# Patient Record
Sex: MALE | Race: WHITE | NOT HISPANIC OR LATINO | Employment: FULL TIME | URBAN - NONMETROPOLITAN AREA
[De-identification: names, ages, dates, MRNs, and addresses within clinical notes are randomized per-mention and may not be internally consistent; named-entity substitution may affect disease eponyms.]

---

## 2017-02-03 ENCOUNTER — TRANSCRIBE ORDERS (OUTPATIENT)
Dept: LAB | Facility: OTHER | Age: 37
End: 2017-02-03

## 2017-02-03 ENCOUNTER — LAB (OUTPATIENT)
Dept: LAB | Facility: OTHER | Age: 37
End: 2017-02-03

## 2017-02-03 DIAGNOSIS — Z02.1 DRUG SCREENING, PRE-EMPLOYMENT: Primary | ICD-10-CM

## 2017-02-03 DIAGNOSIS — Z02.1 PRE-EMPLOYMENT DRUG TESTING: ICD-10-CM

## 2017-02-03 DIAGNOSIS — Z02.1 DRUG SCREENING, PRE-EMPLOYMENT: ICD-10-CM

## 2018-04-10 DIAGNOSIS — I10 HYPERTENSION, UNSPECIFIED TYPE: Primary | ICD-10-CM

## 2018-04-17 ENCOUNTER — LAB (OUTPATIENT)
Dept: LAB | Facility: OTHER | Age: 38
End: 2018-04-17

## 2018-04-17 DIAGNOSIS — I10 HYPERTENSION, UNSPECIFIED TYPE: ICD-10-CM

## 2018-04-17 LAB
ALBUMIN SERPL-MCNC: 4.4 G/DL (ref 3.2–5.5)
ALBUMIN/GLOB SERPL: 1.7 G/DL (ref 1–3)
ALP SERPL-CCNC: 66 U/L (ref 15–121)
ALT SERPL W P-5'-P-CCNC: 44 U/L (ref 10–60)
ANION GAP SERPL CALCULATED.3IONS-SCNC: 9 MMOL/L (ref 5–15)
AST SERPL-CCNC: 29 U/L (ref 10–60)
BILIRUB SERPL-MCNC: 0.9 MG/DL (ref 0.2–1)
BUN BLD-MCNC: 14 MG/DL (ref 8–25)
BUN/CREAT SERPL: 12.7 (ref 7–25)
CALCIUM SPEC-SCNC: 9.4 MG/DL (ref 8.4–10.8)
CHLORIDE SERPL-SCNC: 104 MMOL/L (ref 100–112)
CHOLEST SERPL-MCNC: 191 MG/DL (ref 150–200)
CO2 SERPL-SCNC: 27 MMOL/L (ref 20–32)
CREAT BLD-MCNC: 1.1 MG/DL (ref 0.4–1.3)
DEPRECATED RDW RBC AUTO: 41.9 FL (ref 35.1–43.9)
EOSINOPHIL # BLD MANUAL: 0.16 10*3/MM3 (ref 0–0.7)
EOSINOPHIL NFR BLD MANUAL: 2 % (ref 0–7)
ERYTHROCYTE [DISTWIDTH] IN BLOOD BY AUTOMATED COUNT: 12.8 % (ref 11.5–14.5)
GFR SERPL CREATININE-BSD FRML MDRD: 76 ML/MIN/1.73 (ref 70–162)
GLOBULIN UR ELPH-MCNC: 2.6 GM/DL (ref 2.5–4.6)
GLUCOSE BLD-MCNC: 96 MG/DL (ref 70–100)
HCT VFR BLD AUTO: 46 % (ref 39–49)
HDLC SERPL-MCNC: 44 MG/DL (ref 35–100)
HGB BLD-MCNC: 15.9 G/DL (ref 13.7–17.3)
LDLC SERPL CALC-MCNC: 113 MG/DL
LDLC/HDLC SERPL: 2.58 {RATIO}
LYMPHOCYTES # BLD MANUAL: 1.42 10*3/MM3 (ref 0.6–4.2)
LYMPHOCYTES NFR BLD MANUAL: 18 % (ref 10–50)
LYMPHOCYTES NFR BLD MANUAL: 8 % (ref 0–12)
MCH RBC QN AUTO: 31.2 PG (ref 26.5–34)
MCHC RBC AUTO-ENTMCNC: 34.6 G/DL (ref 31.5–36.3)
MCV RBC AUTO: 90.2 FL (ref 80–98)
MONOCYTES # BLD AUTO: 0.63 10*3/MM3 (ref 0–0.9)
NEUTROPHILS # BLD AUTO: 5.67 10*3/MM3 (ref 2–8.6)
NEUTROPHILS NFR BLD MANUAL: 69 % (ref 37–80)
NEUTS BAND NFR BLD MANUAL: 3 % (ref 0–5)
PLATELET # BLD AUTO: 322 10*3/MM3 (ref 150–450)
PMV BLD AUTO: 10.3 FL (ref 8–12)
POTASSIUM BLD-SCNC: 4.7 MMOL/L (ref 3.4–5.4)
PROT SERPL-MCNC: 7 G/DL (ref 6.7–8.2)
RBC # BLD AUTO: 5.1 10*6/MM3 (ref 4.37–5.74)
RBC MORPH BLD: NORMAL
SMALL PLATELETS BLD QL SMEAR: ADEQUATE
SODIUM BLD-SCNC: 140 MMOL/L (ref 134–146)
TRIGL SERPL-MCNC: 168 MG/DL (ref 35–160)
VLDLC SERPL-MCNC: 33.6 MG/DL
WBC MORPH BLD: NORMAL
WBC NRBC COR # BLD: 7.88 10*3/MM3 (ref 3.2–9.8)

## 2018-04-17 PROCEDURE — 80061 LIPID PANEL: CPT | Performed by: INTERNAL MEDICINE

## 2018-04-17 PROCEDURE — 36415 COLL VENOUS BLD VENIPUNCTURE: CPT | Performed by: INTERNAL MEDICINE

## 2018-04-17 PROCEDURE — 80053 COMPREHEN METABOLIC PANEL: CPT | Performed by: INTERNAL MEDICINE

## 2018-04-17 PROCEDURE — 85025 COMPLETE CBC W/AUTO DIFF WBC: CPT | Performed by: INTERNAL MEDICINE

## 2018-04-24 ENCOUNTER — OFFICE VISIT (OUTPATIENT)
Dept: FAMILY MEDICINE CLINIC | Facility: CLINIC | Age: 38
End: 2018-04-24

## 2018-04-24 VITALS
OXYGEN SATURATION: 98 % | BODY MASS INDEX: 34.21 KG/M2 | SYSTOLIC BLOOD PRESSURE: 128 MMHG | DIASTOLIC BLOOD PRESSURE: 72 MMHG | HEART RATE: 71 BPM | TEMPERATURE: 97.3 F | HEIGHT: 69 IN | WEIGHT: 231 LBS

## 2018-04-24 DIAGNOSIS — K64.0 GRADE I HEMORRHOIDS: Chronic | ICD-10-CM

## 2018-04-24 DIAGNOSIS — R53.83 FATIGUE, UNSPECIFIED TYPE: ICD-10-CM

## 2018-04-24 DIAGNOSIS — E78.1 HYPERTRIGLYCERIDEMIA: Chronic | ICD-10-CM

## 2018-04-24 DIAGNOSIS — J30.1 ACUTE SEASONAL ALLERGIC RHINITIS DUE TO POLLEN: Primary | Chronic | ICD-10-CM

## 2018-04-24 DIAGNOSIS — E66.09 CLASS 1 OBESITY DUE TO EXCESS CALORIES WITHOUT SERIOUS COMORBIDITY WITH BODY MASS INDEX (BMI) OF 34.0 TO 34.9 IN ADULT: Chronic | ICD-10-CM

## 2018-04-24 PROCEDURE — 99214 OFFICE O/P EST MOD 30 MIN: CPT | Performed by: INTERNAL MEDICINE

## 2018-04-24 RX ORDER — FLUTICASONE PROPIONATE 50 MCG
1 SPRAY, SUSPENSION (ML) NASAL
COMMUNITY

## 2018-04-24 RX ORDER — LORATADINE 10 MG/1
10 TABLET ORAL
COMMUNITY
End: 2019-05-01 | Stop reason: ALTCHOICE

## 2018-04-24 NOTE — PROGRESS NOTES
"Subjective     History of Present Illness     Zheng Viera is a 37 y.o. male who comes in for physical exam.  He has not been seen here since May 2015.             Review of Systems   Constitutional: Negative for chills, fatigue and fever.   HENT: Negative for congestion, ear pain, postnasal drip, sinus pressure and sore throat.    Respiratory: Negative for cough, shortness of breath and wheezing.    Cardiovascular: Negative for chest pain, palpitations and leg swelling.   Gastrointestinal: Negative for abdominal pain, blood in stool, constipation, diarrhea, nausea and vomiting.   Endocrine: Negative for cold intolerance, heat intolerance, polydipsia and polyuria.   Genitourinary: Negative for dysuria, frequency, hematuria and urgency.   Skin: Negative for rash.   Neurological: Negative for syncope and weakness.        Objective     Vitals:    04/24/18 1600   BP: 128/72   Pulse: 71   Temp: 97.3 °F (36.3 °C)   TempSrc: Oral   SpO2: 98%   Weight: 105 kg (231 lb)   Height: 175.3 cm (69\")     Physical Exam   Constitutional: He is oriented to person, place, and time. He appears well-developed and well-nourished. No distress.   HENT:   Head: Normocephalic and atraumatic.   Nose: Right sinus exhibits no maxillary sinus tenderness and no frontal sinus tenderness. Left sinus exhibits no maxillary sinus tenderness and no frontal sinus tenderness.   Mouth/Throat: Uvula is midline, oropharynx is clear and moist and mucous membranes are normal. No oral lesions. No tonsillar exudate.   Eyes: Conjunctivae and EOM are normal. Pupils are equal, round, and reactive to light.   Neck: Trachea normal. Neck supple. No JVD present. Carotid bruit is not present. No tracheal deviation present. No thyroid mass and no thyromegaly present.   Cardiovascular: Normal rate, regular rhythm, normal heart sounds and intact distal pulses.   No extrasystoles are present. PMI is not displaced.    No murmur heard.  Pulmonary/Chest: Effort normal and " breath sounds normal. No accessory muscle usage. No respiratory distress. He has no decreased breath sounds. He has no wheezes. He has no rhonchi. He has no rales.   Abdominal: Soft. Bowel sounds are normal. He exhibits no distension. There is no hepatosplenomegaly. There is no tenderness.     Vascular Status -  His right foot exhibits normal foot vasculature  and no edema. His left foot exhibits normal foot vasculature  and no edema.  Lymphadenopathy:     He has no cervical adenopathy.   Neurological: He is alert and oriented to person, place, and time. No cranial nerve deficit. Coordination normal.   Skin: Skin is warm, dry and intact. No rash noted. No cyanosis. Nails show no clubbing.   Psychiatric: He has a normal mood and affect. His speech is normal and behavior is normal. Judgment and thought content normal.   Vitals reviewed.        Assessment/Plan     There are no diagnoses linked to this encounter.    Lab on 04/17/2018   Component Date Value Ref Range Status   • Glucose 04/17/2018 96  70 - 100 mg/dL Final   • BUN 04/17/2018 14  8 - 25 mg/dL Final   • Creatinine 04/17/2018 1.10  0.40 - 1.30 mg/dL Final   • Sodium 04/17/2018 140  134 - 146 mmol/L Final   • Potassium 04/17/2018 4.7  3.4 - 5.4 mmol/L Final   • Chloride 04/17/2018 104  100 - 112 mmol/L Final   • CO2 04/17/2018 27.0  20.0 - 32.0 mmol/L Final   • Calcium 04/17/2018 9.4  8.4 - 10.8 mg/dL Final   • Total Protein 04/17/2018 7.0  6.7 - 8.2 g/dL Final   • Albumin 04/17/2018 4.40  3.20 - 5.50 g/dL Final   • ALT (SGPT) 04/17/2018 44  10 - 60 U/L Final   • AST (SGOT) 04/17/2018 29  10 - 60 U/L Final   • Alkaline Phosphatase 04/17/2018 66  15 - 121 U/L Final   • Total Bilirubin 04/17/2018 0.9  0.2 - 1.0 mg/dL Final   • eGFR Non African Amer 04/17/2018 76  70 - 162 mL/min/1.73 Final   • Globulin 04/17/2018 2.6  2.5 - 4.6 gm/dL Final   • A/G Ratio 04/17/2018 1.7  1.0 - 3.0 g/dL Final   • BUN/Creatinine Ratio 04/17/2018 12.7  7.0 - 25.0 Final   • Anion Gap  04/17/2018 9.0  5.0 - 15.0 mmol/L Final   • Total Cholesterol 04/17/2018 191  150 - 200 mg/dL Final   • Triglycerides 04/17/2018 168* 35 - 160 mg/dL Final   • HDL Cholesterol 04/17/2018 44  35 - 100 mg/dL Final   • LDL Cholesterol  04/17/2018 113  mg/dL Final   • VLDL Cholesterol 04/17/2018 33.6  mg/dL Final   • LDL/HDL Ratio 04/17/2018 2.58   Final   • WBC 04/17/2018 7.88  3.20 - 9.80 10*3/mm3 Final   • RBC 04/17/2018 5.10  4.37 - 5.74 10*6/mm3 Final   • Hemoglobin 04/17/2018 15.9  13.7 - 17.3 g/dL Final   • Hematocrit 04/17/2018 46.0  39.0 - 49.0 % Final   • MCV 04/17/2018 90.2  80.0 - 98.0 fL Final   • MCH 04/17/2018 31.2  26.5 - 34.0 pg Final   • MCHC 04/17/2018 34.6  31.5 - 36.3 g/dL Final   • RDW 04/17/2018 12.8  11.5 - 14.5 % Final   • RDW-SD 04/17/2018 41.9  35.1 - 43.9 fl Final   • MPV 04/17/2018 10.3  8.0 - 12.0 fL Final   • Platelets 04/17/2018 322  150 - 450 10*3/mm3 Final   • Neutrophil % 04/17/2018 69.0  37.0 - 80.0 % Final   • Lymphocyte % 04/17/2018 18.0  10.0 - 50.0 % Final   • Monocyte % 04/17/2018 8.0  0.0 - 12.0 % Final   • Eosinophil % 04/17/2018 2.0  0.0 - 7.0 % Final   • Bands %  04/17/2018 3.0  0.0 - 5.0 % Final   • Neutrophils Absolute 04/17/2018 5.67  2.00 - 8.60 10*3/mm3 Final   • Lymphocytes Absolute 04/17/2018 1.42  0.60 - 4.20 10*3/mm3 Final   • Monocytes Absolute 04/17/2018 0.63  0.00 - 0.90 10*3/mm3 Final   • Eosinophils Absolute 04/17/2018 0.16  0.00 - 0.70 10*3/mm3 Final   • RBC Morphology 04/17/2018 Normal  Normal Final   • WBC Morphology 04/17/2018 Normal  Normal Final   • Platelet Estimate 04/17/2018 Adequate  Normal Final   ]

## 2018-04-24 NOTE — PROGRESS NOTES
Subjective        History of Present Illness     Zheng Viera is a 37 y.o. male who comes in for physical exam.  He is  with a 9-year old son.  He has not been seen in the office in three years.  He reports significant fatigue and is concerned about his testosterone level.  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  There is a large amount of travel by car covering his region.  He is enjoying the job.  He can work from home at times, which has been helpful while they have been completing the building of their new home in [place].    He continues to have occasional hemorrhoid flares.  He denies burning or itching, but rarely sees occasional small amount of rectal bleeding with defecation.     He has a history of allergic rhinitis, but denies significant springtime symptoms thus far this year.  We have successfully used Flonase and Claritin in the past.    He is quite muscular, but also with excessive body fat, making his BMI above goal at 34.1.  He is rather large framed.  We discussed more aerobic exercise with less emphasis on building additional muscle mass.    He reports fatigue symptoms for the past few months.  He reports libido seems a little less than usual.  He denies any erectile difficulties.  He is concerned that his testosterone levels might be low.  He agrees that his energy level is usually much better when he is exercising regularly.  He helps to resume regular exercise soon out of a have completed building a home in [place].    The patient's relevant past medical, surgical, and social history was reviewed in Epic.   Lab results are reviewed with the patient today.  Fasting glucose 96.  CBC and CMP unremarkable.  Total cholesterol 191.  HDL 44.  .  Triglycerides 168.  Liver and renal function normal.       Review of Systems   Constitutional: Positive for fatigue. Negative for chills and fever.   HENT:  "Negative for congestion, ear pain, postnasal drip, sinus pressure and sore throat.    Respiratory: Negative for cough, shortness of breath and wheezing.    Cardiovascular: Negative for chest pain, palpitations and leg swelling.   Gastrointestinal: Negative for abdominal pain, blood in stool, constipation, diarrhea, nausea and vomiting.   Endocrine: Negative for cold intolerance, heat intolerance, polydipsia and polyuria.   Genitourinary: Negative for dysuria, frequency, hematuria and urgency.   Skin: Negative for rash.   Neurological: Negative for syncope and weakness.        Objective     Vitals:    04/24/18 1600   BP: 128/72   Pulse: 71   Temp: 97.3 °F (36.3 °C)   TempSrc: Oral   SpO2: 98%   Weight: 105 kg (231 lb)   Height: 175.3 cm (69\")     Physical Exam   Constitutional: He is oriented to person, place, and time. He appears well-developed and well-nourished. No distress.   HENT:   Head: Normocephalic and atraumatic.   Nose: Right sinus exhibits no maxillary sinus tenderness and no frontal sinus tenderness. Left sinus exhibits no maxillary sinus tenderness and no frontal sinus tenderness.   Mouth/Throat: Uvula is midline, oropharynx is clear and moist and mucous membranes are normal. No oral lesions. No tonsillar exudate.   Clear postnasal drip.    Eyes: Conjunctivae and EOM are normal. Pupils are equal, round, and reactive to light.   Mild allergy changes noted.    Neck: Trachea normal. Neck supple. No JVD present. Carotid bruit is not present. No tracheal deviation present. No thyroid mass and no thyromegaly present.   No lymph node or thyroid enlargement.    Cardiovascular: Normal rate, regular rhythm, normal heart sounds and intact distal pulses.   No extrasystoles are present. PMI is not displaced.    No murmur heard.  Pulmonary/Chest: Effort normal and breath sounds normal. No accessory muscle usage. No respiratory distress. He has no decreased breath sounds. He has no wheezes. He has no rhonchi. He has " no rales.   Abdominal: Soft. Bowel sounds are normal. He exhibits no distension. There is no hepatosplenomegaly. There is no tenderness.     Vascular Status -  His right foot exhibits normal foot vasculature  and no edema. His left foot exhibits normal foot vasculature  and no edema.  Lymphadenopathy:     He has no cervical adenopathy.   Neurological: He is alert and oriented to person, place, and time. No cranial nerve deficit. Coordination normal.   Skin: Skin is warm, dry and intact. No rash noted. No cyanosis. Nails show no clubbing.   Psychiatric: He has a normal mood and affect. His speech is normal and behavior is normal. Judgment and thought content normal.   Vitals reviewed.       Assessment/Plan      For the fatigue, we will check a TSH and Free and total testosterone.  I believe regular aerobic exercise will greatly improve his energy level.  He denies symptoms of sleep apnea.    Notify me if hemorrhoidal symptoms or rare evidence of mild rectal bleeding changes.  He denies a family history of colon cancer.    Reduce carbohydrates in the diet and increase aerobic exercise to reduce triglycerides and increase HDL.    He will return in one year for annual exam with fasting labs one week prior.     Scribed for Dr. Benitez by Ashlie Dom[facility]nguez [facility].     Diagnoses and all orders for this visit:    Acute seasonal allergic rhinitis due to pollen    Fatigue, unspecified type  -     TSH; Future  -     Testosterone, Free, Total; Future  -     Vitamin B12; Future  -     CBC Auto Differential; Future  -     Comprehensive Metabolic Panel; Future    Hypertriglyceridemia - mild  -     Comprehensive Metabolic Panel; Future  -     Lipid Panel; Future  -     Testosterone, Free, Total; Future    Class 1 obesity due to excess calories without serious comorbidity with body mass index (BMI) of 34.0 to 34.9 in adult    Grade I hemorrhoids    Other orders  -     fluticasone (FLONASE) 50 MCG/ACT nasal spray; 1 spray into each  nostril.  -     loratadine (CLARITIN) 10 MG tablet; Take 10 mg by mouth.        Lab on 04/17/2018   Component Date Value Ref Range Status   • Glucose 04/17/2018 96  70 - 100 mg/dL Final   • BUN 04/17/2018 14  8 - 25 mg/dL Final   • Creatinine 04/17/2018 1.10  0.40 - 1.30 mg/dL Final   • Sodium 04/17/2018 140  134 - 146 mmol/L Final   • Potassium 04/17/2018 4.7  3.4 - 5.4 mmol/L Final   • Chloride 04/17/2018 104  100 - 112 mmol/L Final   • CO2 04/17/2018 27.0  20.0 - 32.0 mmol/L Final   • Calcium 04/17/2018 9.4  8.4 - 10.8 mg/dL Final   • Total Protein 04/17/2018 7.0  6.7 - 8.2 g/dL Final   • Albumin 04/17/2018 4.40  3.20 - 5.50 g/dL Final   • ALT (SGPT) 04/17/2018 44  10 - 60 U/L Final   • AST (SGOT) 04/17/2018 29  10 - 60 U/L Final   • Alkaline Phosphatase 04/17/2018 66  15 - 121 U/L Final   • Total Bilirubin 04/17/2018 0.9  0.2 - 1.0 mg/dL Final   • eGFR Non African Amer 04/17/2018 76  70 - 162 mL/min/1.73 Final   • Globulin 04/17/2018 2.6  2.5 - 4.6 gm/dL Final   • A/G Ratio 04/17/2018 1.7  1.0 - 3.0 g/dL Final   • BUN/Creatinine Ratio 04/17/2018 12.7  7.0 - 25.0 Final   • Anion Gap 04/17/2018 9.0  5.0 - 15.0 mmol/L Final   • Total Cholesterol 04/17/2018 191  150 - 200 mg/dL Final   • Triglycerides 04/17/2018 168* 35 - 160 mg/dL Final   • HDL Cholesterol 04/17/2018 44  35 - 100 mg/dL Final   • LDL Cholesterol  04/17/2018 113  mg/dL Final   • VLDL Cholesterol 04/17/2018 33.6  mg/dL Final   • LDL/HDL Ratio 04/17/2018 2.58   Final   • WBC 04/17/2018 7.88  3.20 - 9.80 10*3/mm3 Final   • RBC 04/17/2018 5.10  4.37 - 5.74 10*6/mm3 Final   • Hemoglobin 04/17/2018 15.9  13.7 - 17.3 g/dL Final   • Hematocrit 04/17/2018 46.0  39.0 - 49.0 % Final   • MCV 04/17/2018 90.2  80.0 - 98.0 fL Final   • MCH 04/17/2018 31.2  26.5 - 34.0 pg Final   • MCHC 04/17/2018 34.6  31.5 - 36.3 g/dL Final   • RDW 04/17/2018 12.8  11.5 - 14.5 % Final   • RDW-SD 04/17/2018 41.9  35.1 - 43.9 fl Final   • MPV 04/17/2018 10.3  8.0 - 12.0 fL Final   •  Platelets 04/17/2018 322  150 - 450 10*3/mm3 Final   • Neutrophil % 04/17/2018 69.0  37.0 - 80.0 % Final   • Lymphocyte % 04/17/2018 18.0  10.0 - 50.0 % Final   • Monocyte % 04/17/2018 8.0  0.0 - 12.0 % Final   • Eosinophil % 04/17/2018 2.0  0.0 - 7.0 % Final   • Bands %  04/17/2018 3.0  0.0 - 5.0 % Final   • Neutrophils Absolute 04/17/2018 5.67  2.00 - 8.60 10*3/mm3 Final   • Lymphocytes Absolute 04/17/2018 1.42  0.60 - 4.20 10*3/mm3 Final   • Monocytes Absolute 04/17/2018 0.63  0.00 - 0.90 10*3/mm3 Final   • Eosinophils Absolute 04/17/2018 0.16  0.00 - 0.70 10*3/mm3 Final   • RBC Morphology 04/17/2018 Normal  Normal Final   • WBC Morphology 04/17/2018 Normal  Normal Final   • Platelet Estimate 04/17/2018 Adequate  Normal Final   ]

## 2018-04-24 NOTE — PATIENT INSTRUCTIONS
Exercising to Lose Weight  Exercising can help you to lose weight. In order to lose weight through exercise, you need to do vigorous-intensity exercise. You can tell that you are exercising with vigorous intensity if you are breathing very hard and fast and cannot hold a conversation while exercising.  Moderate-intensity exercise helps to maintain your current weight. You can tell that you are exercising at a moderate level if you have a higher heart rate and faster breathing, but you are still able to hold a conversation.  How often should I exercise?  Choose an activity that you enjoy and set realistic goals. Your health care provider can help you to make an activity plan that works for you. Exercise regularly as directed by your health care provider. This may include:  · Doing resistance training twice each week, such as:  ¨ Push-ups.  ¨ Sit-ups.  ¨ Lifting weights.  ¨ Using resistance bands.  · Doing a given intensity of exercise for a given amount of time. Choose from these options:  ¨ 150 minutes of moderate-intensity exercise every week.  ¨ 75 minutes of vigorous-intensity exercise every week.  ¨ A mix of moderate-intensity and vigorous-intensity exercise every week.  Children, pregnant women, people who are out of shape, people who are overweight, and older adults may need to consult a health care provider for individual recommendations. If you have any sort of medical condition, be sure to consult your health care provider before starting a new exercise program.  What are some activities that can help me to lose weight?  · Walking at a rate of at least 4.5 miles an hour.  · Jogging or running at a rate of 5 miles per hour.  · Biking at a rate of at least 10 miles per hour.  · Lap swimming.  · Roller-skating or in-line skating.  · Cross-country skiing.  · Vigorous competitive sports, such as football, basketball, and soccer.  · Jumping rope.  · Aerobic dancing.  How can I be more active in my day-to-day  activities?  · Use the stairs instead of the elevator.  · Take a walk during your lunch break.  · If you drive, park your car farther away from work or school.  · If you take public transportation, get off one stop early and walk the rest of the way.  · Make all of your phone calls while standing up and walking around.  · Get up, stretch, and walk around every 30 minutes throughout the day.  What guidelines should I follow while exercising?  · Do not exercise so much that you hurt yourself, feel dizzy, or get very short of breath.  · Consult your health care provider prior to starting a new exercise program.  · Wear comfortable clothes and shoes with good support.  · Drink plenty of water while you exercise to prevent dehydration or heat stroke. Body water is lost during exercise and must be replaced.  · Work out until you breathe faster and your heart beats faster.  This information is not intended to replace advice given to you by your health care provider. Make sure you discuss any questions you have with your health care provider.  Document Released: 01/20/2012 Document Revised: 05/25/2017 Document Reviewed: 05/21/2015  [employer] Interactive Patient Education © 2017 [employer]      Calorie Counting for Weight Loss  Calories are units of energy. Your body needs a certain amount of calories from food to keep you going throughout the day. When you eat more calories than your body needs, your body stores the extra calories as fat. When you eat fewer calories than your body needs, your body burns fat to get the energy it needs.  Calorie counting means keeping track of how many calories you eat and drink each day. Calorie counting can be helpful if you need to lose weight. If you make sure to eat fewer calories than your body needs, you should lose weight. Ask your health care provider what a healthy weight is for you.  For calorie counting to work, you will need to eat the right number of calories in a day in order  to lose a healthy amount of weight per week. A dietitian can help you determine how many calories you need in a day and will give you suggestions on how to reach your calorie goal.  · A healthy amount of weight to lose per week is usually 1-2 lb (0.5-0.9 kg). This usually means that your daily calorie intake should be reduced by 500-750 calories.  · Eating 1,200 - 1,500 calories per day can help most women lose weight.  · Eating 1,500 - 1,800 calories per day can help most men lose weight.  What is my plan?  My goal is to have __________ calories per day.  If I have this many calories per day, I should lose around __________ pounds per week.  What do I need to know about calorie counting?  In order to meet your daily calorie goal, you will need to:  · Find out how many calories are in each food you would like to eat. Try to do this before you eat.  · Decide how much of the food you plan to eat.  · Write down what you ate and how many calories it had. Doing this is called keeping a food log.  To successfully lose weight, it is important to balance calorie counting with a healthy lifestyle that includes regular activity. Aim for 150 minutes of moderate exercise (such as walking) or 75 minutes of vigorous exercise (such as running) each week.  Where do I find calorie information?     The number of calories in a food can be found on a Nutrition Facts label. If a food does not have a Nutrition Facts label, try to look up the calories online or ask your dietitian for help.  Remember that calories are listed per serving. If you choose to have more than one serving of a food, you will have to multiply the calories per serving by the amount of servings you plan to eat. For example, the label on a package of bread might say that a serving size is 1 slice and that there are 90 calories in a serving. If you eat 1 slice, you will have eaten 90 calories. If you eat 2 slices, you will have eaten 180 calories.  How do I keep a food  "log?  Immediately after each meal, record the following information in your food log:  · What you ate. Don't forget to include toppings, sauces, and other extras on the food.  · How much you ate. This can be measured in cups, ounces, or number of items.  · How many calories each food and drink had.  · The total number of calories in the meal.  Keep your food log near you, such as in a small notebook in your pocket, or use a mobile kip or website. Some programs will calculate calories for you and show you how many calories you have left for the day to meet your goal.  What are some calorie counting tips?  · Use your calories on foods and drinks that will fill you up and not leave you hungry:  ¨ Some examples of foods that fill you up are nuts and nut butters, vegetables, lean proteins, and high-fiber foods like whole grains. High-fiber foods are foods with more than 5 g fiber per serving.  ¨ Drinks such as sodas, specialty coffee drinks, alcohol, and juices have a lot of calories, yet do not fill you up.  · Eat nutritious foods and avoid empty calories. Empty calories are calories you get from foods or beverages that do not have many vitamins or protein, such as candy, sweets, and soda. It is better to have a nutritious high-calorie food (such as an avocado) than a food with few nutrients (such as a bag of chips).  · Know how many calories are in the foods you eat most often. This will help you calculate calorie counts faster.  · Pay attention to calories in drinks. Low-calorie drinks include water and unsweetened drinks.  · Pay attention to nutrition labels for \"low fat\" or \"fat free\" foods. These foods sometimes have the same amount of calories or more calories than the full fat versions. They also often have added sugar, starch, or salt, to make up for flavor that was removed with the fat.  · Find a way of tracking calories that works for you. Get creative. Try different apps or programs if writing down calories " does not work for you.  What are some portion control tips?  · Know how many calories are in a serving. This will help you know how many servings of a certain food you can have.  · Use a measuring cup to measure serving sizes. You could also try weighing out portions on a kitchen scale. With time, you will be able to estimate serving sizes for some foods.  · Take some time to put servings of different foods on your favorite plates, bowls, and cups so you know what a serving looks like.  · Try not to eat straight from a bag or box. Doing this can lead to overeating. Put the amount you would like to eat in a cup or on a plate to make sure you are eating the right portion.  · Use smaller plates, glasses, and bowls to prevent overeating.  · Try not to multitask (for example, watch TV or use your computer) while eating. If it is time to eat, sit down at a table and enjoy your food. This will help you to know when you are full. It will also help you to be aware of what you are eating and how much you are eating.  What are tips for following this plan?  Reading food labels   · Check the calorie count compared to the serving size. The serving size may be smaller than what you are used to eating.  · Check the source of the calories. Make sure the food you are eating is high in vitamins and protein and low in saturated and trans fats.  Shopping   · Read nutrition labels while you shop. This will help you make healthy decisions before you decide to purchase your food.  · Make a grocery list and stick to it.  Cooking   · Try to cook your favorite foods in a healthier way. For example, try baking instead of frying.  · Use low-fat dairy products.  Meal planning   · Use more fruits and vegetables. Half of your plate should be fruits and vegetables.  · Include lean proteins like poultry and fish.  How do I count calories when eating out?  · Ask for smaller portion sizes.  · Consider sharing an entree and sides instead of getting  your own entree.  · If you get your own entree, eat only half. Ask for a box at the beginning of your meal and put the rest of your entree in it so you are not tempted to eat it.  · If calories are listed on the menu, choose the lower calorie options.  · Choose dishes that include vegetables, fruits, whole grains, low-fat dairy products, and lean protein.  · Choose items that are boiled, broiled, grilled, or steamed. Stay away from items that are buttered, battered, fried, or served with cream sauce. Items labeled “crispy” are usually fried, unless stated otherwise.  · Choose water, low-fat milk, unsweetened iced tea, or other drinks without added sugar. If you want an alcoholic beverage, choose a lower calorie option such as a glass of wine or light beer.  · Ask for dressings, sauces, and syrups on the side. These are usually high in calories, so you should limit the amount you eat.  · If you want a salad, choose a garden salad and ask for grilled meats. Avoid extra toppings like thomas, cheese, or fried items. Ask for the dressing on the side, or ask for olive oil and vinegar or lemon to use as dressing.  · Estimate how many servings of a food you are given. For example, a serving of cooked rice is ½ cup or about the size of half a baseball. Knowing serving sizes will help you be aware of how much food you are eating at restaurants. The list below tells you how big or small some common portion sizes are based on everyday objects:  ¨ 1 oz--4 stacked dice.  ¨ 3 oz--1 deck of cards.  ¨ 1 tsp--1 die.  ¨ 1 Tbsp--½ a ping-pong ball.  ¨ 2 Tbsp--1 ping-pong ball.  ¨ ½ cup--½ baseball.  ¨ 1 cup--1 baseball.  Summary  · Calorie counting means keeping track of how many calories you eat and drink each day. If you eat fewer calories than your body needs, you should lose weight.  · A healthy amount of weight to lose per week is usually 1-2 lb (0.5-0.9 kg). This usually means reducing your daily calorie intake by 500-750  calories.  · The number of calories in a food can be found on a Nutrition Facts label. If a food does not have a Nutrition Facts label, try to look up the calories online or ask your dietitian for help.  · Use your calories on foods and drinks that will fill you up, and not on foods and drinks that will leave you hungry.  · Use smaller plates, glasses, and bowls to prevent overeating.  This information is not intended to replace advice given to you by your health care provider. Make sure you discuss any questions you have with your health care provider.  Document Released: 12/18/2006 Document Revised: 11/17/2017 Document Reviewed: 11/17/2017  [employer] Patient Education © 2017 [employer]

## 2018-04-30 ENCOUNTER — LAB (OUTPATIENT)
Dept: LAB | Facility: OTHER | Age: 38
End: 2018-04-30

## 2018-04-30 DIAGNOSIS — R53.83 FATIGUE, UNSPECIFIED TYPE: ICD-10-CM

## 2018-04-30 LAB — TSH SERPL DL<=0.05 MIU/L-ACNC: 3.15 MIU/ML (ref 0.46–4.68)

## 2018-04-30 PROCEDURE — 36415 COLL VENOUS BLD VENIPUNCTURE: CPT | Performed by: INTERNAL MEDICINE

## 2018-04-30 PROCEDURE — 84443 ASSAY THYROID STIM HORMONE: CPT | Performed by: INTERNAL MEDICINE

## 2018-04-30 PROCEDURE — 84403 ASSAY OF TOTAL TESTOSTERONE: CPT | Performed by: INTERNAL MEDICINE

## 2018-04-30 PROCEDURE — 84402 ASSAY OF FREE TESTOSTERONE: CPT | Performed by: INTERNAL MEDICINE

## 2018-05-02 LAB
TESTOST FREE SERPL-MCNC: 4.8 PG/ML (ref 8.7–25.1)
TESTOST SERPL-MCNC: 262 NG/DL (ref 264–916)

## 2018-05-18 ENCOUNTER — OFFICE VISIT (OUTPATIENT)
Dept: FAMILY MEDICINE CLINIC | Facility: CLINIC | Age: 38
End: 2018-05-18

## 2018-05-18 ENCOUNTER — CLINICAL SUPPORT (OUTPATIENT)
Dept: FAMILY MEDICINE CLINIC | Facility: CLINIC | Age: 38
End: 2018-05-18

## 2018-05-18 VITALS
HEART RATE: 68 BPM | TEMPERATURE: 98 F | HEIGHT: 69 IN | DIASTOLIC BLOOD PRESSURE: 88 MMHG | WEIGHT: 233.6 LBS | SYSTOLIC BLOOD PRESSURE: 140 MMHG | BODY MASS INDEX: 34.6 KG/M2

## 2018-05-18 DIAGNOSIS — E78.1 HYPERTRIGLYCERIDEMIA: Chronic | ICD-10-CM

## 2018-05-18 DIAGNOSIS — E29.1 MALE HYPOGONADISM: Primary | Chronic | ICD-10-CM

## 2018-05-18 DIAGNOSIS — E66.09 CLASS 1 OBESITY DUE TO EXCESS CALORIES WITHOUT SERIOUS COMORBIDITY WITH BODY MASS INDEX (BMI) OF 34.0 TO 34.9 IN ADULT: Chronic | ICD-10-CM

## 2018-05-18 DIAGNOSIS — E29.1 MALE HYPOGONADISM: Chronic | ICD-10-CM

## 2018-05-18 PROCEDURE — 96372 THER/PROPH/DIAG INJ SC/IM: CPT | Performed by: INTERNAL MEDICINE

## 2018-05-18 PROCEDURE — 99214 OFFICE O/P EST MOD 30 MIN: CPT | Performed by: INTERNAL MEDICINE

## 2018-05-18 RX ORDER — TESTOSTERONE CYPIONATE 200 MG/ML
200 INJECTION, SOLUTION INTRAMUSCULAR
Status: DISCONTINUED | OUTPATIENT
Start: 2018-05-18 | End: 2018-08-21

## 2018-05-18 RX ADMIN — TESTOSTERONE CYPIONATE 200 MG: 200 INJECTION, SOLUTION INTRAMUSCULAR at 09:48

## 2018-05-18 NOTE — PROGRESS NOTES
Subjective          History of Present Illness     Zheng Viera is a 37 y.o. male who comes in today to review labs to check testosterone level.  He was seen last month after not being seen here for over three years.  He was expressing significant fatigue and concern that his testosterone levels might be low.  Labs reveal evidence of hypogonadism with total testosterone 262 and free testosterone 4.8.   His wife is accompanying him today and reports she started noticing decreased libido and increased fatigue in the past 6-8 months. He denies any erectile difficulties.      He is quite muscular, but also has excessive body fat, making his BMI above goal at 34.5.  He is rather large framed.  We discussed more aerobic exercise with less emphasis on building additional muscle mass.  He reports he was successful last year with weight loss of approximately 20 pounds.  I recommended additional weight loss with a goal of gradually losing 15-20 pounds over the next six months.        We discussed risks and benefits of testosterone replacement for hypogonadism including available options.  He voiced concerns of topical replacement due to having an infant and children at home as well as other infants who visit with family frequently and feels he injections would be a better choice for him and his family.            Review of Systems   Constitutional: Positive for fatigue. Negative for chills and fever.   HENT: Negative for congestion, ear pain, postnasal drip, sinus pressure and sore throat.    Respiratory: Negative for cough, shortness of breath and wheezing.    Cardiovascular: Negative for chest pain, palpitations and leg swelling.   Gastrointestinal: Negative for abdominal pain, blood in stool, constipation, diarrhea, nausea and vomiting.   Endocrine: Negative for cold intolerance, heat intolerance, polydipsia and polyuria.   Genitourinary: Negative for dysuria, frequency, hematuria and urgency.        Decreased libido  "  Skin: Negative for rash.   Neurological: Negative for syncope and weakness.      Objective     Vitals:    05/18/18 0832   BP: 140/88   Pulse: 68   Temp: 98 °F (36.7 °C)   TempSrc: Oral   Weight: 106 kg (233 lb 9.6 oz)   Height: 175.3 cm (69\")     Physical Exam   Constitutional: He is oriented to person, place, and time. He appears well-developed and well-nourished. No distress.   Muscular male.  Accompanied by his wife.    HENT:   Head: Normocephalic and atraumatic.   Nose: Nose normal.   Mouth/Throat: Oropharynx is clear and moist. No oropharyngeal exudate.   Eyes: EOM are normal. Pupils are equal, round, and reactive to light.   Neck: Neck supple. No JVD present. No thyromegaly present.   Cardiovascular: Normal rate, regular rhythm and normal heart sounds.    Pulmonary/Chest: Effort normal and breath sounds normal. No accessory muscle usage. No respiratory distress. He has no wheezes. He has no rales.   Abdominal: Soft. Bowel sounds are normal. He exhibits no distension. There is no tenderness.   Musculoskeletal: He exhibits no edema.   Lymphadenopathy:     He has no cervical adenopathy.   Neurological: He is alert and oriented to person, place, and time. No cranial nerve deficit.   Psychiatric: He has a normal mood and affect. His speech is normal and behavior is normal. Judgment and thought content normal.     Future Appointments  Date Time Provider Department Center   11/21/2018 9:00 AM Chris Benitez MD [facility] None   5/1/2019 8:00 AM Chris Benitez MD [facility] None       Assessment/Plan      For the hypogonadism, we discussed risks and benefits of testosterone replacement.  He is concerned about trying topical agents due to having an infant in the home and would like to try the testosterone injections.  He will have his first injection of Depo-testerone today in the injection clinic with repeat innjections 1 mL into the shoulder, thigh, or buttocks Every 30 (Thirty) Days.  We will recheck a free and " total testosterone in 4-6 months.  I believe regular aerobic exercise will greatly improve his energy level. I recommended weight loss with a goal of gradually losing 15-20 pounds over the next six months.  He voices understanding.     He will return in one year (May 2019)  for annual exam with fasting labs one week prior.     Scribed for Dr. Benitez by [facility].      Diagnoses and all orders for this visit:    Male hypogonadism  -     Testosterone Cypionate (DEPOTESTOTERONE CYPIONATE) injection 200 mg; Inject 1 mL into the shoulder, thigh, or buttocks Every 30 (Thirty) Days.  -     Comprehensive Metabolic Panel; Future  -     Testosterone, Free, Total; Future    Hypertriglyceridemia - mild  -     Comprehensive Metabolic Panel; Future  -     Lipid Panel; Future    Class 1 obesity due to excess calories without serious comorbidity with body mass index (BMI) of 34.0 to 34.9 in adult        Lab on 04/30/2018   Component Date Value Ref Range Status   • TSH 04/30/2018 3.150  0.460 - 4.680 mIU/mL Final   • Testosterone, Total 04/30/2018 262* 264 - 916 ng/dL Final    Adult male reference interval is based on a population of  healthy nonobese males (BMI <30) between 19 and 39 years old.  Buffy, et.al. JCEM 2017,102;[id number]. PMID: [id number].   • Testosterone, Free 04/30/2018 4.8* 8.7 - 25.1 pg/mL Final   ]

## 2018-06-19 ENCOUNTER — CLINICAL SUPPORT (OUTPATIENT)
Dept: FAMILY MEDICINE CLINIC | Facility: CLINIC | Age: 38
End: 2018-06-19

## 2018-06-19 DIAGNOSIS — E29.1 MALE HYPOGONADISM: Chronic | ICD-10-CM

## 2018-06-19 PROCEDURE — 96372 THER/PROPH/DIAG INJ SC/IM: CPT | Performed by: INTERNAL MEDICINE

## 2018-06-19 RX ADMIN — TESTOSTERONE CYPIONATE 200 MG: 200 INJECTION, SOLUTION INTRAMUSCULAR at 10:08

## 2018-07-18 ENCOUNTER — CLINICAL SUPPORT (OUTPATIENT)
Dept: FAMILY MEDICINE CLINIC | Facility: CLINIC | Age: 38
End: 2018-07-18

## 2018-07-18 DIAGNOSIS — E29.1 MALE HYPOGONADISM: Primary | ICD-10-CM

## 2018-07-18 PROCEDURE — 96372 THER/PROPH/DIAG INJ SC/IM: CPT | Performed by: INTERNAL MEDICINE

## 2018-07-18 RX ADMIN — TESTOSTERONE CYPIONATE 200 MG: 200 INJECTION, SOLUTION INTRAMUSCULAR at 16:33

## 2018-08-21 ENCOUNTER — CLINICAL SUPPORT (OUTPATIENT)
Dept: FAMILY MEDICINE CLINIC | Facility: CLINIC | Age: 38
End: 2018-08-21

## 2018-08-21 DIAGNOSIS — E29.1 MALE HYPOGONADISM: Chronic | ICD-10-CM

## 2018-08-21 PROCEDURE — 96372 THER/PROPH/DIAG INJ SC/IM: CPT | Performed by: INTERNAL MEDICINE

## 2018-08-21 RX ORDER — TESTOSTERONE CYPIONATE 200 MG/ML
200 INJECTION, SOLUTION INTRAMUSCULAR
Qty: 10 ML | Refills: 0 | Status: SHIPPED | OUTPATIENT
Start: 2018-08-21 | End: 2018-09-24 | Stop reason: ALTCHOICE

## 2018-08-21 RX ADMIN — TESTOSTERONE CYPIONATE 200 MG: 200 INJECTION, SOLUTION INTRAMUSCULAR at 15:19

## 2018-09-24 ENCOUNTER — CLINICAL SUPPORT (OUTPATIENT)
Dept: FAMILY MEDICINE CLINIC | Facility: CLINIC | Age: 38
End: 2018-09-24

## 2018-09-24 DIAGNOSIS — E29.1 MALE HYPOGONADISM: Chronic | ICD-10-CM

## 2018-09-24 DIAGNOSIS — E29.1 MALE HYPOGONADISM: Primary | ICD-10-CM

## 2018-09-24 PROCEDURE — 96372 THER/PROPH/DIAG INJ SC/IM: CPT | Performed by: INTERNAL MEDICINE

## 2018-09-24 RX ORDER — TESTOSTERONE CYPIONATE 200 MG/ML
200 INJECTION, SOLUTION INTRAMUSCULAR
Status: DISCONTINUED | OUTPATIENT
Start: 2018-09-24 | End: 2018-12-03

## 2018-09-24 RX ADMIN — TESTOSTERONE CYPIONATE 200 MG: 200 INJECTION, SOLUTION INTRAMUSCULAR at 14:39

## 2018-10-19 ENCOUNTER — CLINICAL SUPPORT (OUTPATIENT)
Dept: FAMILY MEDICINE CLINIC | Facility: CLINIC | Age: 38
End: 2018-10-19

## 2018-10-19 DIAGNOSIS — E29.1 MALE HYPOGONADISM: Chronic | ICD-10-CM

## 2018-10-19 PROCEDURE — 96372 THER/PROPH/DIAG INJ SC/IM: CPT | Performed by: INTERNAL MEDICINE

## 2018-10-19 RX ADMIN — TESTOSTERONE CYPIONATE 200 MG: 200 INJECTION, SOLUTION INTRAMUSCULAR at 09:14

## 2018-11-08 ENCOUNTER — LAB REQUISITION (OUTPATIENT)
Dept: LAB | Facility: OTHER | Age: 38
End: 2018-11-08

## 2018-11-08 DIAGNOSIS — Z00.00 ROUTINE GENERAL MEDICAL EXAMINATION AT A HEALTH CARE FACILITY: ICD-10-CM

## 2018-11-08 PROCEDURE — UDS COCC - COLLECTION FEE ONLY: Performed by: INTERNAL MEDICINE

## 2018-11-26 ENCOUNTER — LAB (OUTPATIENT)
Dept: LAB | Facility: OTHER | Age: 38
End: 2018-11-26

## 2018-11-26 ENCOUNTER — CLINICAL SUPPORT (OUTPATIENT)
Dept: FAMILY MEDICINE CLINIC | Facility: CLINIC | Age: 38
End: 2018-11-26

## 2018-11-26 DIAGNOSIS — E29.1 MALE HYPOGONADISM: Chronic | ICD-10-CM

## 2018-11-26 PROCEDURE — 96372 THER/PROPH/DIAG INJ SC/IM: CPT | Performed by: INTERNAL MEDICINE

## 2018-11-26 PROCEDURE — 84403 ASSAY OF TOTAL TESTOSTERONE: CPT | Performed by: INTERNAL MEDICINE

## 2018-11-26 PROCEDURE — 36415 COLL VENOUS BLD VENIPUNCTURE: CPT | Performed by: INTERNAL MEDICINE

## 2018-11-26 PROCEDURE — 84402 ASSAY OF FREE TESTOSTERONE: CPT | Performed by: INTERNAL MEDICINE

## 2018-11-26 RX ADMIN — TESTOSTERONE CYPIONATE 200 MG: 200 INJECTION, SOLUTION INTRAMUSCULAR at 11:19

## 2018-11-28 LAB
TESTOST FREE SERPL-MCNC: 10.5 PG/ML (ref 8.7–25.1)
TESTOST SERPL-MCNC: 276 NG/DL (ref 264–916)

## 2018-12-03 ENCOUNTER — OFFICE VISIT (OUTPATIENT)
Dept: FAMILY MEDICINE CLINIC | Facility: CLINIC | Age: 38
End: 2018-12-03

## 2018-12-03 ENCOUNTER — LAB (OUTPATIENT)
Dept: LAB | Facility: OTHER | Age: 38
End: 2018-12-03

## 2018-12-03 VITALS
TEMPERATURE: 98 F | DIASTOLIC BLOOD PRESSURE: 86 MMHG | WEIGHT: 224 LBS | HEIGHT: 69 IN | HEART RATE: 68 BPM | SYSTOLIC BLOOD PRESSURE: 126 MMHG | BODY MASS INDEX: 33.18 KG/M2

## 2018-12-03 DIAGNOSIS — E29.1 MALE HYPOGONADISM: Chronic | ICD-10-CM

## 2018-12-03 DIAGNOSIS — E78.1 HYPERTRIGLYCERIDEMIA: Chronic | ICD-10-CM

## 2018-12-03 DIAGNOSIS — R53.83 FATIGUE, UNSPECIFIED TYPE: ICD-10-CM

## 2018-12-03 DIAGNOSIS — E29.1 MALE HYPOGONADISM: Primary | Chronic | ICD-10-CM

## 2018-12-03 DIAGNOSIS — E66.09 CLASS 1 OBESITY DUE TO EXCESS CALORIES WITHOUT SERIOUS COMORBIDITY WITH BODY MASS INDEX (BMI) OF 34.0 TO 34.9 IN ADULT: Chronic | ICD-10-CM

## 2018-12-03 LAB
ALBUMIN SERPL-MCNC: 4.8 G/DL (ref 3.5–5)
ALBUMIN/GLOB SERPL: 1.9 G/DL (ref 1.1–1.8)
ALP SERPL-CCNC: 71 U/L (ref 38–126)
ALT SERPL W P-5'-P-CCNC: 49 U/L
ANION GAP SERPL CALCULATED.3IONS-SCNC: 7 MMOL/L (ref 5–15)
AST SERPL-CCNC: 31 U/L (ref 17–59)
BASOPHILS # BLD AUTO: 0.05 10*3/MM3 (ref 0–0.2)
BASOPHILS NFR BLD AUTO: 0.6 % (ref 0–2)
BILIRUB SERPL-MCNC: 1.1 MG/DL (ref 0.2–1.3)
BUN BLD-MCNC: 13 MG/DL (ref 9–20)
BUN/CREAT SERPL: 10.5 (ref 7–25)
CALCIUM SPEC-SCNC: 9.2 MG/DL (ref 8.4–10.2)
CHLORIDE SERPL-SCNC: 107 MMOL/L (ref 98–107)
CHOLEST SERPL-MCNC: 167 MG/DL (ref 150–200)
CO2 SERPL-SCNC: 27 MMOL/L (ref 22–30)
CREAT BLD-MCNC: 1.24 MG/DL (ref 0.66–1.25)
DEPRECATED RDW RBC AUTO: 42.4 FL (ref 35.1–43.9)
EOSINOPHIL # BLD AUTO: 0.1 10*3/MM3 (ref 0–0.7)
EOSINOPHIL NFR BLD AUTO: 1.2 % (ref 0–7)
ERYTHROCYTE [DISTWIDTH] IN BLOOD BY AUTOMATED COUNT: 13 % (ref 11.5–14.5)
GFR SERPL CREATININE-BSD FRML MDRD: 66 ML/MIN/1.73 (ref 70–162)
GLOBULIN UR ELPH-MCNC: 2.5 GM/DL (ref 2.3–3.5)
GLUCOSE BLD-MCNC: 92 MG/DL (ref 74–99)
HCT VFR BLD AUTO: 45.8 % (ref 39–49)
HDLC SERPL-MCNC: 40 MG/DL (ref 40–59)
HGB BLD-MCNC: 15.4 G/DL (ref 13.7–17.3)
LDLC SERPL CALC-MCNC: 101 MG/DL
LDLC/HDLC SERPL: 2.54 {RATIO} (ref 0–3.55)
LYMPHOCYTES # BLD AUTO: 1.53 10*3/MM3 (ref 0.6–4.2)
LYMPHOCYTES NFR BLD AUTO: 18.4 % (ref 10–50)
MCH RBC QN AUTO: 30.8 PG (ref 26.5–34)
MCHC RBC AUTO-ENTMCNC: 33.6 G/DL (ref 31.5–36.3)
MCV RBC AUTO: 91.6 FL (ref 80–98)
MONOCYTES # BLD AUTO: 0.75 10*3/MM3 (ref 0–0.9)
MONOCYTES NFR BLD AUTO: 9 % (ref 0–12)
NEUTROPHILS # BLD AUTO: 5.9 10*3/MM3 (ref 2–8.6)
NEUTROPHILS NFR BLD AUTO: 70.8 % (ref 37–80)
PLATELET # BLD AUTO: 305 10*3/MM3 (ref 150–450)
PMV BLD AUTO: 10.1 FL (ref 8–12)
POTASSIUM BLD-SCNC: 4.2 MMOL/L (ref 3.4–5)
PROT SERPL-MCNC: 7.3 G/DL (ref 6.3–8.2)
RBC # BLD AUTO: 5 10*6/MM3 (ref 4.37–5.74)
SODIUM BLD-SCNC: 141 MMOL/L (ref 137–145)
TRIGL SERPL-MCNC: 128 MG/DL
VIT B12 BLD-MCNC: 409 PG/ML (ref 239–931)
VLDLC SERPL-MCNC: 25.6 MG/DL
WBC NRBC COR # BLD: 8.33 10*3/MM3 (ref 3.2–9.8)

## 2018-12-03 PROCEDURE — 85025 COMPLETE CBC W/AUTO DIFF WBC: CPT | Performed by: INTERNAL MEDICINE

## 2018-12-03 PROCEDURE — 80061 LIPID PANEL: CPT | Performed by: INTERNAL MEDICINE

## 2018-12-03 PROCEDURE — 80053 COMPREHEN METABOLIC PANEL: CPT | Performed by: INTERNAL MEDICINE

## 2018-12-03 PROCEDURE — 36415 COLL VENOUS BLD VENIPUNCTURE: CPT | Performed by: INTERNAL MEDICINE

## 2018-12-03 PROCEDURE — 82607 VITAMIN B-12: CPT | Performed by: INTERNAL MEDICINE

## 2018-12-03 PROCEDURE — 99214 OFFICE O/P EST MOD 30 MIN: CPT | Performed by: INTERNAL MEDICINE

## 2018-12-03 RX ORDER — TESTOSTERONE CYPIONATE 200 MG/ML
200 INJECTION, SOLUTION INTRAMUSCULAR
Qty: 10 ML | Refills: 5 | Status: CANCELLED | OUTPATIENT
Start: 2018-12-03

## 2018-12-03 RX ORDER — TESTOSTERONE CYPIONATE 200 MG/ML
200 INJECTION, SOLUTION INTRAMUSCULAR
Qty: 10 ML | Refills: 5 | Status: SHIPPED | OUTPATIENT
Start: 2018-12-03 | End: 2019-08-01 | Stop reason: SDUPTHER

## 2018-12-03 RX ORDER — TESTOSTERONE CYPIONATE 200 MG/ML
200 INJECTION, SOLUTION INTRAMUSCULAR
Status: DISCONTINUED | OUTPATIENT
Start: 2018-12-04 | End: 2020-03-23

## 2018-12-03 RX ORDER — TESTOSTERONE CYPIONATE 200 MG/ML
200 INJECTION, SOLUTION INTRAMUSCULAR
Status: CANCELLED | OUTPATIENT
Start: 2018-12-04

## 2018-12-04 NOTE — PROGRESS NOTES
Subjective     Zheng Viera is a 38 y.o. male.     History of Present Illness     Prakash is here for 6-7 month follow-up of male hypogonadism diagnosed spring 2018 as part of a workup for fatigue symptoms.  See last note for more details.  I recommended a trial of testosterone replacement.  The patient wanted to avoid topical testosterone due to 10-year-old son who is constantly playfully wrestling with him.  He wanted to avoid any potential exposure of the topical testosterone with his son or wife.  The patient has been receiving injections of 200 mg Depakote testosterone monthly.  He reports a nice improvement in all of his symptoms for nearly 2 weeks, but then the symptoms returned for the remainder of the month.  His testosterone levels have been repeated and these were obtained 3-4 weeks after his last testosterone injection, and his free testosterone is better, but his total testosterone is not significantly improved.  We discussed options.    The patient is muscular, but carrying excessive body fat as well.  We discussed the benefits of weight loss in addressing his symptoms.  He has been able to lose approximately 10 pounds of weight this year, but reports she has not been trying.  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  We discussed appropriate diet and weight loss goals.    His elevated triglycerides are actually slightly improved with the weight loss.  His HDL is lower, likely due to the testosterone replacement.  He has not been meeting his diet, exercise, or weight loss goals.  CBC reveals no evidence of polycythemia.    Review of Systems   Constitutional: Positive for fatigue. Negative for chills and fever.   HENT: Negative for congestion, ear pain, postnasal drip, sinus pressure and sore throat.    Respiratory: Negative for cough, shortness of breath and wheezing.    Cardiovascular: Negative for chest  "pain, palpitations and leg swelling.   Gastrointestinal: Negative for abdominal pain, blood in stool, constipation, diarrhea, nausea and vomiting.   Endocrine: Negative for cold intolerance, heat intolerance, polydipsia and polyuria.   Genitourinary: Negative for dysuria, frequency, hematuria and urgency.        Decreased libido   Skin: Negative for rash.   Neurological: Negative for syncope and weakness.       Objective     /86   Pulse 68   Temp 98 °F (36.7 °C) (Oral)   Ht 175.3 cm (69\")   Wt 102 kg (224 lb)   BMI 33.08 kg/m²     Physical Exam   Constitutional: He is oriented to person, place, and time. He appears well-developed and well-nourished. No distress.   Muscular male.  Pleasant.   HENT:   Head: Normocephalic and atraumatic.   Nose: Nose normal.   Mouth/Throat: Oropharynx is clear and moist. No oropharyngeal exudate.   Eyes: EOM are normal. Pupils are equal, round, and reactive to light.   Neck: Neck supple. No JVD present. No thyromegaly present.   Cardiovascular: Normal rate, regular rhythm and normal heart sounds.   Pulmonary/Chest: Effort normal and breath sounds normal. No accessory muscle usage. No respiratory distress. He has no wheezes. He has no rales.   Abdominal: Soft. Bowel sounds are normal. He exhibits no distension. There is no tenderness.   Overweight/obese abdomen   Genitourinary:   Genitourinary Comments:  exam deferred   Musculoskeletal: He exhibits no edema.   Lymphadenopathy:     He has no cervical adenopathy.   Neurological: He is alert and oriented to person, place, and time. No cranial nerve deficit.   Psychiatric: He has a normal mood and affect. His speech is normal and behavior is normal. Judgment and thought content normal.       No flowsheet data found.    Assessment/Plan     We discussed options.  This is a rather complex situation.  We reviewed pros and cons of hormone replacement and he wants to proceed.  We will try Depo testosterone injections 200 mg every 2 " weeks, or twice monthly.  We will recheck a testosterone level and 6 months.  We will try to minimize the amount of supplemental testosterone.  I emphasized the benefits of aggressive weight loss and regular physical activity.  We will continue to monitor his lipid abnormalities and monitor for the development of any polycythemia.    He will return next week for a testosterone injection, as he received an injection approximately one week ago.    Return to clinic in 6 months with fasting labs prior.        Diagnoses and all orders for this visit:    Male hypogonadism  -     Testosterone Cypionate (DEPOTESTOTERONE CYPIONATE) injection 200 mg; Inject 1 mL into the appropriate muscle as directed by prescriber Every 14 (Fourteen) Days.  -     CBC Auto Differential; Future  -     Comprehensive Metabolic Panel; Future  -     Testosterone, Free, Total; Future    Class 1 obesity due to excess calories without serious comorbidity with body mass index (BMI) of 34.0 to 34.9 in adult  -     CBC Auto Differential; Future  -     Comprehensive Metabolic Panel; Future  -     TSH; Future    Hypertriglyceridemia - mild  -     Comprehensive Metabolic Panel; Future  -     Lipid Panel; Future  -     TSH; Future    Other orders  -     Cancel: Testosterone Cypionate (DEPOTESTOTERONE CYPIONATE) injection 200 mg; Inject 1 mL into the appropriate muscle as directed by prescriber Every 14 (Fourteen) Days.  -     Cancel: Testosterone Cypionate (DEPO-TESTOSTERONE) 200 MG/ML injection; Inject 1 mL into the appropriate muscle as directed by prescriber Every 14 (Fourteen) Days.  -     Testosterone Cypionate (DEPO-TESTOSTERONE) 200 MG/ML injection; Inject 1 mL into the appropriate muscle as directed by prescriber Every 14 (Fourteen) Days.        Lab on 12/03/2018   Component Date Value Ref Range Status   • WBC 12/03/2018 8.33  3.20 - 9.80 10*3/mm3 Final   • RBC 12/03/2018 5.00  4.37 - 5.74 10*6/mm3 Final   • Hemoglobin 12/03/2018 15.4  13.7 - 17.3  g/dL Final   • Hematocrit 12/03/2018 45.8  39.0 - 49.0 % Final   • MCV 12/03/2018 91.6  80.0 - 98.0 fL Final   • MCH 12/03/2018 30.8  26.5 - 34.0 pg Final   • MCHC 12/03/2018 33.6  31.5 - 36.3 g/dL Final   • RDW 12/03/2018 13.0  11.5 - 14.5 % Final   • RDW-SD 12/03/2018 42.4  35.1 - 43.9 fl Final   • MPV 12/03/2018 10.1  8.0 - 12.0 fL Final   • Platelets 12/03/2018 305  150 - 450 10*3/mm3 Final   • Neutrophil % 12/03/2018 70.8  37.0 - 80.0 % Final   • Lymphocyte % 12/03/2018 18.4  10.0 - 50.0 % Final   • Monocyte % 12/03/2018 9.0  0.0 - 12.0 % Final   • Eosinophil % 12/03/2018 1.2  0.0 - 7.0 % Final   • Basophil % 12/03/2018 0.6  0.0 - 2.0 % Final   • Neutrophils, Absolute 12/03/2018 5.90  2.00 - 8.60 10*3/mm3 Final   • Lymphocytes, Absolute 12/03/2018 1.53  0.60 - 4.20 10*3/mm3 Final   • Monocytes, Absolute 12/03/2018 0.75  0.00 - 0.90 10*3/mm3 Final   • Eosinophils, Absolute 12/03/2018 0.10  0.00 - 0.70 10*3/mm3 Final   • Basophils, Absolute 12/03/2018 0.05  0.00 - 0.20 10*3/mm3 Final   • Glucose 12/03/2018 92  74 - 99 mg/dL Final   • BUN 12/03/2018 13  9 - 20 mg/dL Final   • Creatinine 12/03/2018 1.24  0.66 - 1.25 mg/dL Final   • Sodium 12/03/2018 141  137 - 145 mmol/L Final   • Potassium 12/03/2018 4.2  3.4 - 5.0 mmol/L Final   • Chloride 12/03/2018 107  98 - 107 mmol/L Final   • CO2 12/03/2018 27.0  22.0 - 30.0 mmol/L Final   • Calcium 12/03/2018 9.2  8.4 - 10.2 mg/dL Final   • Total Protein 12/03/2018 7.3  6.3 - 8.2 g/dL Final   • Albumin 12/03/2018 4.80  3.50 - 5.00 g/dL Final   • ALT (SGPT) 12/03/2018 49  <=50 U/L Final   • AST (SGOT) 12/03/2018 31  17 - 59 U/L Final   • Alkaline Phosphatase 12/03/2018 71  38 - 126 U/L Final   • Total Bilirubin 12/03/2018 1.1  0.2 - 1.3 mg/dL Final   • eGFR Non African Amer 12/03/2018 66* 70 - 162 mL/min/1.73 Final   • Globulin 12/03/2018 2.5  2.3 - 3.5 gm/dL Final   • A/G Ratio 12/03/2018 1.9* 1.1 - 1.8 g/dL Final   • BUN/Creatinine Ratio 12/03/2018 10.5  7.0 - 25.0 Final    • Anion Gap 12/03/2018 7.0  5.0 - 15.0 mmol/L Final   • Total Cholesterol 12/03/2018 167  150 - 200 mg/dL Final   • Triglycerides 12/03/2018 128  <=150 mg/dL Final   • HDL Cholesterol 12/03/2018 40  40 - 59 mg/dL Final   • LDL Cholesterol  12/03/2018 101* <=100 mg/dL Final   • VLDL Cholesterol 12/03/2018 25.6  mg/dL Final   • LDL/HDL Ratio 12/03/2018 2.54  0.00 - 3.55 Final   Lab on 11/26/2018   Component Date Value Ref Range Status   • Testosterone, Total 11/26/2018 276  264 - 916 ng/dL Final    Adult male reference interval is based on a population of  healthy nonobese males (BMI <30) between 19 and 39 years old.  Buffy et.al. JCEM 2017,102;0615-2440. PMID: [id number].   • Testosterone, Free 11/26/2018 10.5  8.7 - 25.1 pg/mL Final   ]

## 2018-12-10 ENCOUNTER — CLINICAL SUPPORT (OUTPATIENT)
Dept: FAMILY MEDICINE CLINIC | Facility: CLINIC | Age: 38
End: 2018-12-10

## 2018-12-10 DIAGNOSIS — E29.1 MALE HYPOGONADISM: Chronic | ICD-10-CM

## 2018-12-10 PROCEDURE — 96372 THER/PROPH/DIAG INJ SC/IM: CPT | Performed by: INTERNAL MEDICINE

## 2018-12-10 RX ADMIN — TESTOSTERONE CYPIONATE 200 MG: 200 INJECTION, SOLUTION INTRAMUSCULAR at 10:04

## 2019-01-03 ENCOUNTER — CLINICAL SUPPORT (OUTPATIENT)
Dept: FAMILY MEDICINE CLINIC | Facility: CLINIC | Age: 39
End: 2019-01-03

## 2019-01-03 PROCEDURE — 96372 THER/PROPH/DIAG INJ SC/IM: CPT | Performed by: INTERNAL MEDICINE

## 2019-01-03 RX ADMIN — TESTOSTERONE CYPIONATE 200 MG: 200 INJECTION, SOLUTION INTRAMUSCULAR at 09:59

## 2019-01-16 ENCOUNTER — CLINICAL SUPPORT (OUTPATIENT)
Dept: FAMILY MEDICINE CLINIC | Facility: CLINIC | Age: 39
End: 2019-01-16

## 2019-01-16 DIAGNOSIS — E29.1 MALE HYPOGONADISM: Chronic | ICD-10-CM

## 2019-01-16 PROCEDURE — 96372 THER/PROPH/DIAG INJ SC/IM: CPT | Performed by: INTERNAL MEDICINE

## 2019-01-16 RX ADMIN — TESTOSTERONE CYPIONATE 200 MG: 200 INJECTION, SOLUTION INTRAMUSCULAR at 11:07

## 2019-02-01 ENCOUNTER — CLINICAL SUPPORT (OUTPATIENT)
Dept: FAMILY MEDICINE CLINIC | Facility: CLINIC | Age: 39
End: 2019-02-01

## 2019-02-01 DIAGNOSIS — E29.1 MALE HYPOGONADISM: Chronic | ICD-10-CM

## 2019-02-01 PROCEDURE — 96372 THER/PROPH/DIAG INJ SC/IM: CPT | Performed by: INTERNAL MEDICINE

## 2019-02-01 RX ADMIN — TESTOSTERONE CYPIONATE 200 MG: 200 INJECTION, SOLUTION INTRAMUSCULAR at 14:05

## 2019-02-21 ENCOUNTER — CLINICAL SUPPORT (OUTPATIENT)
Dept: FAMILY MEDICINE CLINIC | Facility: CLINIC | Age: 39
End: 2019-02-21

## 2019-02-21 PROCEDURE — 96372 THER/PROPH/DIAG INJ SC/IM: CPT | Performed by: INTERNAL MEDICINE

## 2019-02-21 RX ADMIN — TESTOSTERONE CYPIONATE 200 MG: 200 INJECTION, SOLUTION INTRAMUSCULAR at 10:20

## 2019-03-07 ENCOUNTER — CLINICAL SUPPORT (OUTPATIENT)
Dept: FAMILY MEDICINE CLINIC | Facility: CLINIC | Age: 39
End: 2019-03-07

## 2019-03-07 PROCEDURE — 96372 THER/PROPH/DIAG INJ SC/IM: CPT | Performed by: INTERNAL MEDICINE

## 2019-03-07 RX ADMIN — TESTOSTERONE CYPIONATE 200 MG: 200 INJECTION, SOLUTION INTRAMUSCULAR at 09:22

## 2019-03-26 ENCOUNTER — CLINICAL SUPPORT (OUTPATIENT)
Dept: FAMILY MEDICINE CLINIC | Facility: CLINIC | Age: 39
End: 2019-03-26

## 2019-03-26 DIAGNOSIS — E29.1 MALE HYPOGONADISM: Chronic | ICD-10-CM

## 2019-03-26 PROCEDURE — 96372 THER/PROPH/DIAG INJ SC/IM: CPT | Performed by: INTERNAL MEDICINE

## 2019-03-26 RX ADMIN — TESTOSTERONE CYPIONATE 200 MG: 200 INJECTION, SOLUTION INTRAMUSCULAR at 10:40

## 2019-04-22 ENCOUNTER — CLINICAL SUPPORT (OUTPATIENT)
Dept: FAMILY MEDICINE CLINIC | Facility: CLINIC | Age: 39
End: 2019-04-22

## 2019-04-22 DIAGNOSIS — E29.1 MALE HYPOGONADISM: Chronic | ICD-10-CM

## 2019-04-22 PROCEDURE — 96372 THER/PROPH/DIAG INJ SC/IM: CPT | Performed by: INTERNAL MEDICINE

## 2019-04-22 RX ADMIN — TESTOSTERONE CYPIONATE 200 MG: 200 INJECTION, SOLUTION INTRAMUSCULAR at 11:30

## 2019-04-23 ENCOUNTER — LAB (OUTPATIENT)
Dept: LAB | Facility: OTHER | Age: 39
End: 2019-04-23

## 2019-04-23 DIAGNOSIS — E29.1 MALE HYPOGONADISM: Chronic | ICD-10-CM

## 2019-04-23 DIAGNOSIS — E78.1 HYPERTRIGLYCERIDEMIA: Chronic | ICD-10-CM

## 2019-04-23 DIAGNOSIS — E66.09 CLASS 1 OBESITY DUE TO EXCESS CALORIES WITHOUT SERIOUS COMORBIDITY WITH BODY MASS INDEX (BMI) OF 34.0 TO 34.9 IN ADULT: Chronic | ICD-10-CM

## 2019-04-23 LAB
ALBUMIN SERPL-MCNC: 4.4 G/DL (ref 3.5–5)
ALBUMIN/GLOB SERPL: 1.6 G/DL (ref 1.1–1.8)
ALP SERPL-CCNC: 70 U/L (ref 38–126)
ALT SERPL W P-5'-P-CCNC: 40 U/L
ANION GAP SERPL CALCULATED.3IONS-SCNC: 8 MMOL/L (ref 5–15)
AST SERPL-CCNC: 28 U/L (ref 17–59)
BASOPHILS # BLD AUTO: 0.03 10*3/MM3 (ref 0–0.2)
BASOPHILS NFR BLD AUTO: 0.5 % (ref 0–1.5)
BILIRUB SERPL-MCNC: 0.9 MG/DL (ref 0.2–1.3)
BUN BLD-MCNC: 13 MG/DL (ref 7–23)
BUN/CREAT SERPL: 11.9 (ref 7–25)
CALCIUM SPEC-SCNC: 9.1 MG/DL (ref 8.4–10.2)
CHLORIDE SERPL-SCNC: 106 MMOL/L (ref 101–112)
CHOLEST SERPL-MCNC: 186 MG/DL (ref 150–200)
CO2 SERPL-SCNC: 25 MMOL/L (ref 22–30)
CREAT BLD-MCNC: 1.09 MG/DL (ref 0.7–1.3)
DEPRECATED RDW RBC AUTO: 44.2 FL (ref 37–54)
EOSINOPHIL # BLD AUTO: 0.14 10*3/MM3 (ref 0–0.4)
EOSINOPHIL NFR BLD AUTO: 2.2 % (ref 0.3–6.2)
ERYTHROCYTE [DISTWIDTH] IN BLOOD BY AUTOMATED COUNT: 13.4 % (ref 12.3–15.4)
GFR SERPL CREATININE-BSD FRML MDRD: 76 ML/MIN/1.73 (ref 70–162)
GLOBULIN UR ELPH-MCNC: 2.8 GM/DL (ref 2.3–3.5)
GLUCOSE BLD-MCNC: 92 MG/DL (ref 70–99)
HCT VFR BLD AUTO: 46 % (ref 37.5–51)
HDLC SERPL-MCNC: 44 MG/DL (ref 40–59)
HGB BLD-MCNC: 15.6 G/DL (ref 13–17.7)
LDLC SERPL CALC-MCNC: 113 MG/DL
LDLC/HDLC SERPL: 2.56 {RATIO} (ref 0–3.55)
LYMPHOCYTES # BLD AUTO: 1.31 10*3/MM3 (ref 0.7–3.1)
LYMPHOCYTES NFR BLD AUTO: 20.5 % (ref 19.6–45.3)
MCH RBC QN AUTO: 31 PG (ref 26.6–33)
MCHC RBC AUTO-ENTMCNC: 33.9 G/DL (ref 31.5–35.7)
MCV RBC AUTO: 91.5 FL (ref 79–97)
MONOCYTES # BLD AUTO: 0.54 10*3/MM3 (ref 0.1–0.9)
MONOCYTES NFR BLD AUTO: 8.4 % (ref 5–12)
NEUTROPHILS # BLD AUTO: 4.38 10*3/MM3 (ref 1.7–7)
NEUTROPHILS NFR BLD AUTO: 68.4 % (ref 42.7–76)
PLATELET # BLD AUTO: 260 10*3/MM3 (ref 140–450)
PMV BLD AUTO: 10 FL (ref 6–12)
POTASSIUM BLD-SCNC: 4.2 MMOL/L (ref 3.4–5)
PROT SERPL-MCNC: 7.2 G/DL (ref 6.3–8.6)
RBC # BLD AUTO: 5.03 10*6/MM3 (ref 4.14–5.8)
SODIUM BLD-SCNC: 139 MMOL/L (ref 137–145)
TRIGL SERPL-MCNC: 147 MG/DL
TSH SERPL DL<=0.05 MIU/L-ACNC: 2.61 MIU/ML (ref 0.27–4.2)
VLDLC SERPL-MCNC: 29.4 MG/DL
WBC NRBC COR # BLD: 6.4 10*3/MM3 (ref 3.4–10.8)

## 2019-04-23 PROCEDURE — 80053 COMPREHEN METABOLIC PANEL: CPT | Performed by: INTERNAL MEDICINE

## 2019-04-23 PROCEDURE — 85025 COMPLETE CBC W/AUTO DIFF WBC: CPT | Performed by: INTERNAL MEDICINE

## 2019-04-23 PROCEDURE — 84443 ASSAY THYROID STIM HORMONE: CPT | Performed by: INTERNAL MEDICINE

## 2019-04-23 PROCEDURE — 36415 COLL VENOUS BLD VENIPUNCTURE: CPT | Performed by: INTERNAL MEDICINE

## 2019-04-23 PROCEDURE — 84402 ASSAY OF FREE TESTOSTERONE: CPT | Performed by: INTERNAL MEDICINE

## 2019-04-23 PROCEDURE — 84403 ASSAY OF TOTAL TESTOSTERONE: CPT | Performed by: INTERNAL MEDICINE

## 2019-04-23 PROCEDURE — 80061 LIPID PANEL: CPT | Performed by: INTERNAL MEDICINE

## 2019-04-25 LAB
TESTOST FREE SERPL-MCNC: 11.7 PG/ML (ref 8.7–25.1)
TESTOST SERPL-MCNC: 471 NG/DL (ref 264–916)

## 2019-05-01 ENCOUNTER — OFFICE VISIT (OUTPATIENT)
Dept: FAMILY MEDICINE CLINIC | Facility: CLINIC | Age: 39
End: 2019-05-01

## 2019-05-01 VITALS
HEIGHT: 69 IN | BODY MASS INDEX: 34.66 KG/M2 | SYSTOLIC BLOOD PRESSURE: 138 MMHG | TEMPERATURE: 98.1 F | WEIGHT: 234 LBS | DIASTOLIC BLOOD PRESSURE: 88 MMHG | HEART RATE: 76 BPM

## 2019-05-01 DIAGNOSIS — K21.9 GASTROESOPHAGEAL REFLUX DISEASE WITHOUT ESOPHAGITIS: ICD-10-CM

## 2019-05-01 DIAGNOSIS — J30.1 ACUTE SEASONAL ALLERGIC RHINITIS DUE TO POLLEN: Chronic | ICD-10-CM

## 2019-05-01 DIAGNOSIS — E66.09 CLASS 1 OBESITY DUE TO EXCESS CALORIES WITHOUT SERIOUS COMORBIDITY WITH BODY MASS INDEX (BMI) OF 34.0 TO 34.9 IN ADULT: Chronic | ICD-10-CM

## 2019-05-01 DIAGNOSIS — E29.1 MALE HYPOGONADISM: Primary | Chronic | ICD-10-CM

## 2019-05-01 PROCEDURE — 99214 OFFICE O/P EST MOD 30 MIN: CPT | Performed by: INTERNAL MEDICINE

## 2019-05-01 NOTE — PATIENT INSTRUCTIONS
Calorie Counting for Weight Loss  Calories are units of energy. Your body needs a certain amount of calories from food to keep you going throughout the day. When you eat more calories than your body needs, your body stores the extra calories as fat. When you eat fewer calories than your body needs, your body burns fat to get the energy it needs.  Calorie counting means keeping track of how many calories you eat and drink each day. Calorie counting can be helpful if you need to lose weight. If you make sure to eat fewer calories than your body needs, you should lose weight. Ask your health care provider what a healthy weight is for you.  For calorie counting to work, you will need to eat the right number of calories in a day in order to lose a healthy amount of weight per week. A dietitian can help you determine how many calories you need in a day and will give you suggestions on how to reach your calorie goal.  · A healthy amount of weight to lose per week is usually 1-2 lb (0.5-0.9 kg). This usually means that your daily calorie intake should be reduced by 500-750 calories.  · Eating 1,200 - 1,500 calories per day can help most women lose weight.  · Eating 1,500 - 1,800 calories per day can help most men lose weight.    What is my plan?  My goal is to have __________ calories per day.  If I have this many calories per day, I should lose around __________ pounds per week.  What do I need to know about calorie counting?  In order to meet your daily calorie goal, you will need to:  · Find out how many calories are in each food you would like to eat. Try to do this before you eat.  · Decide how much of the food you plan to eat.  · Write down what you ate and how many calories it had. Doing this is called keeping a food log.    To successfully lose weight, it is important to balance calorie counting with a healthy lifestyle that includes regular activity. Aim for 150 minutes of moderate exercise (such as walking) or 75  minutes of vigorous exercise (such as running) each week.  Where do I find calorie information?    The number of calories in a food can be found on a Nutrition Facts label. If a food does not have a Nutrition Facts label, try to look up the calories online or ask your dietitian for help.  Remember that calories are listed per serving. If you choose to have more than one serving of a food, you will have to multiply the calories per serving by the amount of servings you plan to eat. For example, the label on a package of bread might say that a serving size is 1 slice and that there are 90 calories in a serving. If you eat 1 slice, you will have eaten 90 calories. If you eat 2 slices, you will have eaten 180 calories.  How do I keep a food log?  Immediately after each meal, record the following information in your food log:  · What you ate. Don't forget to include toppings, sauces, and other extras on the food.  · How much you ate. This can be measured in cups, ounces, or number of items.  · How many calories each food and drink had.  · The total number of calories in the meal.    Keep your food log near you, such as in a small notebook in your pocket, or use a mobile kip or website. Some programs will calculate calories for you and show you how many calories you have left for the day to meet your goal.  What are some calorie counting tips?  · Use your calories on foods and drinks that will fill you up and not leave you hungry:  ? Some examples of foods that fill you up are nuts and nut butters, vegetables, lean proteins, and high-fiber foods like whole grains. High-fiber foods are foods with more than 5 g fiber per serving.  ? Drinks such as sodas, specialty coffee drinks, alcohol, and juices have a lot of calories, yet do not fill you up.  · Eat nutritious foods and avoid empty calories. Empty calories are calories you get from foods or beverages that do not have many vitamins or protein, such as candy, sweets, and  "soda. It is better to have a nutritious high-calorie food (such as an avocado) than a food with few nutrients (such as a bag of chips).  · Know how many calories are in the foods you eat most often. This will help you calculate calorie counts faster.  · Pay attention to calories in drinks. Low-calorie drinks include water and unsweetened drinks.  · Pay attention to nutrition labels for \"low fat\" or \"fat free\" foods. These foods sometimes have the same amount of calories or more calories than the full fat versions. They also often have added sugar, starch, or salt, to make up for flavor that was removed with the fat.  · Find a way of tracking calories that works for you. Get creative. Try different apps or programs if writing down calories does not work for you.  What are some portion control tips?  · Know how many calories are in a serving. This will help you know how many servings of a certain food you can have.  · Use a measuring cup to measure serving sizes. You could also try weighing out portions on a kitchen scale. With time, you will be able to estimate serving sizes for some foods.  · Take some time to put servings of different foods on your favorite plates, bowls, and cups so you know what a serving looks like.  · Try not to eat straight from a bag or box. Doing this can lead to overeating. Put the amount you would like to eat in a cup or on a plate to make sure you are eating the right portion.  · Use smaller plates, glasses, and bowls to prevent overeating.  · Try not to multitask (for example, watch TV or use your computer) while eating. If it is time to eat, sit down at a table and enjoy your food. This will help you to know when you are full. It will also help you to be aware of what you are eating and how much you are eating.  What are tips for following this plan?  Reading food labels  · Check the calorie count compared to the serving size. The serving size may be smaller than what you are used to " eating.  · Check the source of the calories. Make sure the food you are eating is high in vitamins and protein and low in saturated and trans fats.  Shopping  · Read nutrition labels while you shop. This will help you make healthy decisions before you decide to purchase your food.  · Make a grocery list and stick to it.  Cooking  · Try to cook your favorite foods in a healthier way. For example, try baking instead of frying.  · Use low-fat dairy products.  Meal planning  · Use more fruits and vegetables. Half of your plate should be fruits and vegetables.  · Include lean proteins like poultry and fish.  How do I count calories when eating out?  · Ask for smaller portion sizes.  · Consider sharing an entree and sides instead of getting your own entree.  · If you get your own entree, eat only half. Ask for a box at the beginning of your meal and put the rest of your entree in it so you are not tempted to eat it.  · If calories are listed on the menu, choose the lower calorie options.  · Choose dishes that include vegetables, fruits, whole grains, low-fat dairy products, and lean protein.  · Choose items that are boiled, broiled, grilled, or steamed. Stay away from items that are buttered, battered, fried, or served with cream sauce. Items labeled “crispy” are usually fried, unless stated otherwise.  · Choose water, low-fat milk, unsweetened iced tea, or other drinks without added sugar. If you want an alcoholic beverage, choose a lower calorie option such as a glass of wine or light beer.  · Ask for dressings, sauces, and syrups on the side. These are usually high in calories, so you should limit the amount you eat.  · If you want a salad, choose a garden salad and ask for grilled meats. Avoid extra toppings like thomas, cheese, or fried items. Ask for the dressing on the side, or ask for olive oil and vinegar or lemon to use as dressing.  · Estimate how many servings of a food you are given. For example, a serving of  cooked rice is ½ cup or about the size of half a baseball. Knowing serving sizes will help you be aware of how much food you are eating at restaurants. The list below tells you how big or small some common portion sizes are based on everyday objects:  ? 1 oz--4 stacked dice.  ? 3 oz--1 deck of cards.  ? 1 tsp--1 die.  ? 1 Tbsp--½ a ping-pong ball.  ? 2 Tbsp--1 ping-pong ball.  ? ½ cup--½ baseball.  ? 1 cup--1 baseball.  Summary  · Calorie counting means keeping track of how many calories you eat and drink each day. If you eat fewer calories than your body needs, you should lose weight.  · A healthy amount of weight to lose per week is usually 1-2 lb (0.5-0.9 kg). This usually means reducing your daily calorie intake by 500-750 calories.  · The number of calories in a food can be found on a Nutrition Facts label. If a food does not have a Nutrition Facts label, try to look up the calories online or ask your dietitian for help.  · Use your calories on foods and drinks that will fill you up, and not on foods and drinks that will leave you hungry.  · Use smaller plates, glasses, and bowls to prevent overeating.  This information is not intended to replace advice given to you by your health care provider. Make sure you discuss any questions you have with your health care provider.  Document Released: 12/18/2006 Document Revised: 11/17/2017 Document Reviewed: 11/17/2017  [employer] Interactive Patient Education © 2019 [employer]      Exercising to Lose Weight  Exercising can help you to lose weight. In order to lose weight through exercise, you need to do vigorous-intensity exercise. You can tell that you are exercising with vigorous intensity if you are breathing very hard and fast and cannot hold a conversation while exercising.  Moderate-intensity exercise helps to maintain your current weight. You can tell that you are exercising at a moderate level if you have a higher heart rate and faster breathing, but you are  still able to hold a conversation.  How often should I exercise?  Choose an activity that you enjoy and set realistic goals. Your health care provider can help you to make an activity plan that works for you. Exercise regularly as directed by your health care provider. This may include:  · Doing resistance training twice each week, such as:  ? Push-ups.  ? Sit-ups.  ? Lifting weights.  ? Using resistance bands.  · Doing a given intensity of exercise for a given amount of time. Choose from these options:  ? 150 minutes of moderate-intensity exercise every week.  ? 75 minutes of vigorous-intensity exercise every week.  ? A mix of moderate-intensity and vigorous-intensity exercise every week.    Children, pregnant women, people who are out of shape, people who are overweight, and older adults may need to consult a health care provider for individual recommendations. If you have any sort of medical condition, be sure to consult your health care provider before starting a new exercise program.  What are some activities that can help me to lose weight?  · Walking at a rate of at least 4.5 miles an hour.  · Jogging or running at a rate of 5 miles per hour.  · Biking at a rate of at least 10 miles per hour.  · Lap swimming.  · Roller-skating or in-line skating.  · Cross-country skiing.  · Vigorous competitive sports, such as football, basketball, and soccer.  · Jumping rope.  · Aerobic dancing.  How can I be more active in my day-to-day activities?  · Use the stairs instead of the elevator.  · Take a walk during your lunch break.  · If you drive, park your car farther away from work or school.  · If you take public transportation, get off one stop early and walk the rest of the way.  · Make all of your phone calls while standing up and walking around.  · Get up, stretch, and walk around every 30 minutes throughout the day.  What guidelines should I follow while exercising?  · Do not exercise so much that you hurt yourself,  feel dizzy, or get very short of breath.  · Consult your health care provider prior to starting a new exercise program.  · Wear comfortable clothes and shoes with good support.  · Drink plenty of water while you exercise to prevent dehydration or heat stroke. Body water is lost during exercise and must be replaced.  · Work out until you breathe faster and your heart beats faster.  This information is not intended to replace advice given to you by your health care provider. Make sure you discuss any questions you have with your health care provider.  Document Released: 01/20/2012 Document Revised: 05/25/2017 Document Reviewed: 05/21/2015  [employer] Interactive Patient Education © 2019 [employer]

## 2019-05-01 NOTE — PROGRESS NOTES
"Subjective        History of Present Illness     Zheng Viera is a 38 y.o. male who comes in for a follow up on hypertriglyceridemia, hypogonadism and obesity. 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   He continues on testosterone injections.  Increasing the frequency of the injections to twice monthly have helped with fatigue symptoms.   Hemoglobin is upper limits of normal, although remains in safe range.  He takes omeprazole for episodic GERD flares at night, but symptom relief is slow at times.  Denies dysphagia or melena.     Xyzal and Flonase are adequately managing his seasonal allergy symptoms.     Weight is up 10 pounds ni the past five months.  Blood pressure 138/88    The patient's relevant past medical, surgical, and social history was reviewed in Epic.   Lab results are reviewed with the patient today. CBC and CMP unremarkable.  Normal liver and renal function.  Total cholesterol 186.  HDL 44.  .  Triglycerides 147.  LDL/HDL ratio 2.56.     Review of Systems   Constitutional: Negative for chills, fatigue and fever.   HENT: Negative for congestion, ear pain, postnasal drip, sinus pressure and sore throat.    Respiratory: Negative for cough, shortness of breath and wheezing.    Cardiovascular: Negative for chest pain, palpitations and leg swelling.   Gastrointestinal: Negative for abdominal pain, blood in stool, constipation, diarrhea, nausea and vomiting.   Endocrine: Negative for cold intolerance, heat intolerance, polydipsia and polyuria.   Genitourinary: Negative for dysuria, frequency, hematuria and urgency.   Skin: Negative for rash.   Neurological: Negative for syncope and weakness.        Objective     Vitals:    05/01/19 1015   BP: 138/88   Pulse: 76   Temp: 98.1 °F (36.7 °C)   TempSrc: Oral   Weight: 106 kg (234 lb)   Height: 175.3 cm (69\")     Physical Exam   Constitutional: He is oriented to person, place, and time. He appears well-developed and " well-nourished. No distress.   Muscular male.   HENT:   Head: Normocephalic and atraumatic.   Nose: Right sinus exhibits no maxillary sinus tenderness and no frontal sinus tenderness. Left sinus exhibits no maxillary sinus tenderness and no frontal sinus tenderness.   Mouth/Throat: Uvula is midline, oropharynx is clear and moist and mucous membranes are normal. No oral lesions. No tonsillar exudate.   Eyes: Conjunctivae and EOM are normal. Pupils are equal, round, and reactive to light.   Neck: Trachea normal. Neck supple. No JVD present. Carotid bruit is not present. No tracheal deviation present. No thyroid mass and no thyromegaly present.   Cardiovascular: Normal rate, regular rhythm, normal heart sounds and intact distal pulses.  No extrasystoles are present. PMI is not displaced.   No murmur heard.  Pulmonary/Chest: Effort normal and breath sounds normal. No accessory muscle usage. No respiratory distress. He has no decreased breath sounds. He has no wheezes. He has no rhonchi. He has no rales.   Abdominal: Soft. Bowel sounds are normal. He exhibits no distension. There is no hepatosplenomegaly. There is no tenderness.     Vascular Status -  His right foot exhibits normal foot vasculature  and no edema. His left foot exhibits normal foot vasculature  and no edema.  Lymphadenopathy:     He has no cervical adenopathy.   Neurological: He is alert and oriented to person, place, and time. No cranial nerve deficit. Coordination normal.   Skin: Skin is warm, dry and intact. No rash noted. No cyanosis. Nails show no clubbing.   Psychiatric: He has a normal mood and affect. His speech is normal and behavior is normal. Judgment and thought content normal.   Vitals reviewed.          Assessment/Plan      Continue with testosterone injections twice monthly for the hypogonadism.  We will continue to monitor hemoglobin and platelets closely with the testosterone replacement.       Recommended he pursue diet, exercise and  weight loss efforts to address obesity.     Continue flonase and Xyzol as needed for seasonal allergies.    For the new problem of episodic nocturnal GERD, OTC  Zantac if needed . Take 2 tums with Zantac for faster symptom relief.    Return in six months for routine follow up with fasting labs one week prior.  I recommended he make sure his next testosterone level is checked 10-14 after his most recent testosterone injection.      Scribed for Dr. Benitez by Ashlie Doímnguez [facility].     Diagnoses and all orders for this visit:    Male hypogonadism  -     Comprehensive Metabolic Panel; Future  -     CBC Auto Differential; Future  -     Testosterone, Free, Total; Future    Class 1 obesity due to excess calories without serious comorbidity with body mass index (BMI) of 34.0 to 34.9 in adult  -     Comprehensive Metabolic Panel; Future  -     CBC Auto Differential; Future    Acute seasonal allergic rhinitis due to pollen    Gastroesophageal reflux disease without esophagitis  -     Comprehensive Metabolic Panel; Future  -     CBC Auto Differential; Future    Other orders  -     Levocetirizine Dihydrochloride (XYZAL PO); Take  by mouth.        Lab on 04/23/2019   Component Date Value Ref Range Status   • WBC 04/23/2019 6.40  3.40 - 10.80 10*3/mm3 Final   • RBC 04/23/2019 5.03  4.14 - 5.80 10*6/mm3 Final   • Hemoglobin 04/23/2019 15.6  13.0 - 17.7 g/dL Final   • Hematocrit 04/23/2019 46.0  37.5 - 51.0 % Final   • MCV 04/23/2019 91.5  79.0 - 97.0 fL Final   • MCH 04/23/2019 31.0  26.6 - 33.0 pg Final   • MCHC 04/23/2019 33.9  31.5 - 35.7 g/dL Final   • RDW 04/23/2019 13.4  12.3 - 15.4 % Final   • RDW-SD 04/23/2019 44.2  37.0 - 54.0 fl Final   • MPV 04/23/2019 10.0  6.0 - 12.0 fL Final   • Platelets 04/23/2019 260  140 - 450 10*3/mm3 Final   • Neutrophil % 04/23/2019 68.4  42.7 - 76.0 % Final   • Lymphocyte % 04/23/2019 20.5  19.6 - 45.3 % Final   • Monocyte % 04/23/2019 8.4  5.0 - 12.0 % Final   • Eosinophil % 04/23/2019 2.2   0.3 - 6.2 % Final   • Basophil % 04/23/2019 0.5  0.0 - 1.5 % Final   • Neutrophils, Absolute 04/23/2019 4.38  1.70 - 7.00 10*3/mm3 Final   • Lymphocytes, Absolute 04/23/2019 1.31  0.70 - 3.10 10*3/mm3 Final   • Monocytes, Absolute 04/23/2019 0.54  0.10 - 0.90 10*3/mm3 Final   • Eosinophils, Absolute 04/23/2019 0.14  0.00 - 0.40 10*3/mm3 Final   • Basophils, Absolute 04/23/2019 0.03  0.00 - 0.20 10*3/mm3 Final   • Glucose 04/23/2019 92  70 - 99 mg/dL Final   • BUN 04/23/2019 13  7 - 23 mg/dL Final   • Creatinine 04/23/2019 1.09  0.70 - 1.30 mg/dL Final   • Sodium 04/23/2019 139  137 - 145 mmol/L Final   • Potassium 04/23/2019 4.2  3.4 - 5.0 mmol/L Final   • Chloride 04/23/2019 106  101 - 112 mmol/L Final   • CO2 04/23/2019 25.0  22.0 - 30.0 mmol/L Final   • Calcium 04/23/2019 9.1  8.4 - 10.2 mg/dL Final   • Total Protein 04/23/2019 7.2  6.3 - 8.6 g/dL Final   • Albumin 04/23/2019 4.40  3.50 - 5.00 g/dL Final   • ALT (SGPT) 04/23/2019 40  <=50 U/L Final   • AST (SGOT) 04/23/2019 28  17 - 59 U/L Final   • Alkaline Phosphatase 04/23/2019 70  38 - 126 U/L Final   • Total Bilirubin 04/23/2019 0.9  0.2 - 1.3 mg/dL Final   • eGFR Non African Amer 04/23/2019 76  70 - 162 mL/min/1.73 Final   • Globulin 04/23/2019 2.8  2.3 - 3.5 gm/dL Final   • A/G Ratio 04/23/2019 1.6  1.1 - 1.8 g/dL Final   • BUN/Creatinine Ratio 04/23/2019 11.9  7.0 - 25.0 Final   • Anion Gap 04/23/2019 8.0  5.0 - 15.0 mmol/L Final   • Total Cholesterol 04/23/2019 186  150 - 200 mg/dL Final   • Triglycerides 04/23/2019 147  <=150 mg/dL Final   • HDL Cholesterol 04/23/2019 44  40 - 59 mg/dL Final   • LDL Cholesterol  04/23/2019 113* <=100 mg/dL Final   • VLDL Cholesterol 04/23/2019 29.4  mg/dL Final   • LDL/HDL Ratio 04/23/2019 2.56  0.00 - 3.55 Final   • TSH 04/23/2019 2.610  0.270 - 4.200 mIU/mL Final   • Testosterone, Total 04/23/2019 471  264 - 916 ng/dL Final    Adult male reference interval is based on a population of  healthy nonobese males (BMI <30)  between 19 and 39 years old.  Buffy et.al. JCEM 2017,102;0924-8278. PMID: 45764724.   • Testosterone, Free 04/23/2019 11.7  8.7 - 25.1 pg/mL Final   ]

## 2019-05-08 ENCOUNTER — CLINICAL SUPPORT (OUTPATIENT)
Dept: FAMILY MEDICINE CLINIC | Facility: CLINIC | Age: 39
End: 2019-05-08

## 2019-05-08 DIAGNOSIS — E29.1 MALE HYPOGONADISM: Chronic | ICD-10-CM

## 2019-05-08 PROCEDURE — 96372 THER/PROPH/DIAG INJ SC/IM: CPT | Performed by: INTERNAL MEDICINE

## 2019-05-08 RX ADMIN — TESTOSTERONE CYPIONATE 200 MG: 200 INJECTION, SOLUTION INTRAMUSCULAR at 08:56

## 2019-05-23 ENCOUNTER — CLINICAL SUPPORT (OUTPATIENT)
Dept: FAMILY MEDICINE CLINIC | Facility: CLINIC | Age: 39
End: 2019-05-23

## 2019-05-23 PROCEDURE — 96372 THER/PROPH/DIAG INJ SC/IM: CPT | Performed by: INTERNAL MEDICINE

## 2019-05-23 RX ADMIN — TESTOSTERONE CYPIONATE 200 MG: 200 INJECTION, SOLUTION INTRAMUSCULAR at 09:34

## 2019-06-24 ENCOUNTER — CLINICAL SUPPORT (OUTPATIENT)
Dept: FAMILY MEDICINE CLINIC | Facility: CLINIC | Age: 39
End: 2019-06-24

## 2019-06-24 DIAGNOSIS — E29.1 MALE HYPOGONADISM: Chronic | ICD-10-CM

## 2019-06-24 PROCEDURE — 96372 THER/PROPH/DIAG INJ SC/IM: CPT | Performed by: INTERNAL MEDICINE

## 2019-06-24 RX ADMIN — TESTOSTERONE CYPIONATE 200 MG: 200 INJECTION, SOLUTION INTRAMUSCULAR at 10:28

## 2019-08-01 ENCOUNTER — OFFICE VISIT (OUTPATIENT)
Dept: FAMILY MEDICINE CLINIC | Facility: CLINIC | Age: 39
End: 2019-08-01

## 2019-08-01 VITALS
WEIGHT: 227 LBS | DIASTOLIC BLOOD PRESSURE: 88 MMHG | HEART RATE: 93 BPM | TEMPERATURE: 98.1 F | OXYGEN SATURATION: 97 % | SYSTOLIC BLOOD PRESSURE: 130 MMHG | BODY MASS INDEX: 33.62 KG/M2 | HEIGHT: 69 IN

## 2019-08-01 DIAGNOSIS — S86.812A STRAIN OF CALF MUSCLE, LEFT, INITIAL ENCOUNTER: ICD-10-CM

## 2019-08-01 DIAGNOSIS — E66.09 CLASS 1 OBESITY DUE TO EXCESS CALORIES WITHOUT SERIOUS COMORBIDITY WITH BODY MASS INDEX (BMI) OF 34.0 TO 34.9 IN ADULT: Chronic | ICD-10-CM

## 2019-08-01 DIAGNOSIS — R07.81 RIB PAIN ON LEFT SIDE: ICD-10-CM

## 2019-08-01 DIAGNOSIS — J30.1 ACUTE SEASONAL ALLERGIC RHINITIS DUE TO POLLEN: Chronic | ICD-10-CM

## 2019-08-01 DIAGNOSIS — E29.1 MALE HYPOGONADISM: Primary | Chronic | ICD-10-CM

## 2019-08-01 PROCEDURE — 99214 OFFICE O/P EST MOD 30 MIN: CPT | Performed by: INTERNAL MEDICINE

## 2019-08-01 RX ORDER — TESTOSTERONE CYPIONATE 200 MG/ML
200 INJECTION, SOLUTION INTRAMUSCULAR
Qty: 10 ML | Refills: 5 | Status: SHIPPED | OUTPATIENT
Start: 2019-08-01 | End: 2019-11-06 | Stop reason: SDUPTHER

## 2019-08-01 NOTE — PROGRESS NOTES
"Subjective     History of Present Illness     Zheng Viera is a 38 y.o. male who comes in for a follow up on hypogonadism.  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     He continues on testosterone injections twice monthly.  Labs in the spring revealed testosterone level at goal and polycythemia with hemoglobin in upper limits of normal, although remaining in safe range. We will continue to monitor.  Repeat labs are scheduled for November.    He reports a fall last month while playing baseball.  He pulled a left calf muscle and experienced left anterior rib pain approx. 9th rib. Denied bruising.  He is having some residual rib pain gradually improving.       Diastolic blood pressure in upper limits at 130/88.  Weight is down 7 pounds in the past three months.      Review of Systems   Constitutional: Negative for chills, fatigue and fever.   HENT: Negative for congestion, ear pain, postnasal drip, sinus pressure and sore throat.    Respiratory: Negative for cough, shortness of breath and wheezing.    Cardiovascular: Negative for chest pain, palpitations and leg swelling.   Gastrointestinal: Negative for abdominal pain, blood in stool, constipation, diarrhea, nausea and vomiting.   Endocrine: Negative for cold intolerance, heat intolerance, polydipsia and polyuria.   Genitourinary: Negative for dysuria, frequency, hematuria and urgency.   Skin: Negative for rash.   Neurological: Negative for syncope and weakness.        Objective     Vitals:    08/01/19 0811   BP: 130/88   BP Location: Left arm   Patient Position: Sitting   Cuff Size: Adult   Pulse: 93   Temp: 98.1 °F (36.7 °C)   TempSrc: Temporal   SpO2: 97%   Weight: 103 kg (227 lb)   Height: 175.3 cm (69\")       Physical Exam   Constitutional: He is oriented to person, place, and time. He appears well-developed and well-nourished. No distress.   Muscular male.    HENT:   Head: Normocephalic and atraumatic.   Nose: Nose normal. "   Mouth/Throat: Oropharynx is clear and moist. No oropharyngeal exudate.   Eyes: EOM are normal. Pupils are equal, round, and reactive to light.   Neck: Neck supple. No JVD present. No thyromegaly present.   Cardiovascular: Normal rate, regular rhythm and normal heart sounds.   Pulmonary/Chest: Effort normal and breath sounds normal. No accessory muscle usage. No respiratory distress. He has no wheezes. He has no rales.   Abdominal: Soft. Bowel sounds are normal. He exhibits no distension. There is no tenderness.   Musculoskeletal: He exhibits no edema.   No chest wall bruising or evidence of rib fracture. Left calf back to baseline.   Lymphadenopathy:     He has no cervical adenopathy.   Neurological: He is alert and oriented to person, place, and time. No cranial nerve deficit.   Psychiatric: He has a normal mood and affect. His speech is normal and behavior is normal. Judgment and thought content normal.     Future Appointments   Date Time Provider Department Center   11/6/2019  8:00 AM Chris Benitez MD [facility] None       Assessment/Plan      Continue with testosterone injections twice monthly for the hypogonadism.  Patient understands the risks associated with this controlled medication, including tolerance and addiction.  He also agrees to only obtain this medication from me, and not from a another provider, unless that provider is covering for me in my absence.  He also agrees to be compliant in dosing, and not self adjust the dose of medication.  A signed controlled substance agreement is on file, and he has received a controlled substance education sheet at this a previous visit.  He has also signed a consent for treatment with a controlled substance as per [facility] policy. DANNA was obtained.    The left rib pain and left calf strain are new problems. No intervention needed, as they are healing.      Return in three months (11/06/2019) for routine follow up with fasting labs one week prior.  I  "recommended he make sure his next testosterone level is checked 10-14 after his most recent testosterone injection.      Scribed for Dr. Benitez by Ashlie Domínguez [facility].     Diagnoses and all orders for this visit:    Male hypogonadism    Class 1 obesity due to excess calories without serious comorbidity with body mass index (BMI) of 34.0 to 34.9 in adult    Acute seasonal allergic rhinitis due to pollen    Rib pain on left side    Strain of calf muscle, left, initial encounter    Other orders  -     Syringe/Needle, Disp, (B-D SYRINGE/NEEDLE 3CC/23GX1\") 23G X 1\" 3 ML misc; Use twice monthly with testosterone injections  -     Needle, Disp, (BD HYPODERMIC NEEDLE) 18G X 1\" misc; Use to draw up testosterone  -     Testosterone Cypionate (DEPO-TESTOSTERONE) 200 MG/ML injection; Inject 1 mL into the appropriate muscle as directed by prescriber Every 14 (Fourteen) Days.        No visits with results within 3 Week(s) from this visit.   Latest known visit with results is:   Lab on 04/23/2019   Component Date Value Ref Range Status   • WBC 04/23/2019 6.40  3.40 - 10.80 10*3/mm3 Final   • RBC 04/23/2019 5.03  4.14 - 5.80 10*6/mm3 Final   • Hemoglobin 04/23/2019 15.6  13.0 - 17.7 g/dL Final   • Hematocrit 04/23/2019 46.0  37.5 - 51.0 % Final   • MCV 04/23/2019 91.5  79.0 - 97.0 fL Final   • MCH 04/23/2019 31.0  26.6 - 33.0 pg Final   • MCHC 04/23/2019 33.9  31.5 - 35.7 g/dL Final   • RDW 04/23/2019 13.4  12.3 - 15.4 % Final   • RDW-SD 04/23/2019 44.2  37.0 - 54.0 fl Final   • MPV 04/23/2019 10.0  6.0 - 12.0 fL Final   • Platelets 04/23/2019 260  140 - 450 10*3/mm3 Final   • Neutrophil % 04/23/2019 68.4  42.7 - 76.0 % Final   • Lymphocyte % 04/23/2019 20.5  19.6 - 45.3 % Final   • Monocyte % 04/23/2019 8.4  5.0 - 12.0 % Final   • Eosinophil % 04/23/2019 2.2  0.3 - 6.2 % Final   • Basophil % 04/23/2019 0.5  0.0 - 1.5 % Final   • Neutrophils, Absolute 04/23/2019 4.38  1.70 - 7.00 10*3/mm3 Final   • Lymphocytes, Absolute " 04/23/2019 1.31  0.70 - 3.10 10*3/mm3 Final   • Monocytes, Absolute 04/23/2019 0.54  0.10 - 0.90 10*3/mm3 Final   • Eosinophils, Absolute 04/23/2019 0.14  0.00 - 0.40 10*3/mm3 Final   • Basophils, Absolute 04/23/2019 0.03  0.00 - 0.20 10*3/mm3 Final   • Glucose 04/23/2019 92  70 - 99 mg/dL Final   • BUN 04/23/2019 13  7 - 23 mg/dL Final   • Creatinine 04/23/2019 1.09  0.70 - 1.30 mg/dL Final   • Sodium 04/23/2019 139  137 - 145 mmol/L Final   • Potassium 04/23/2019 4.2  3.4 - 5.0 mmol/L Final   • Chloride 04/23/2019 106  101 - 112 mmol/L Final   • CO2 04/23/2019 25.0  22.0 - 30.0 mmol/L Final   • Calcium 04/23/2019 9.1  8.4 - 10.2 mg/dL Final   • Total Protein 04/23/2019 7.2  6.3 - 8.6 g/dL Final   • Albumin 04/23/2019 4.40  3.50 - 5.00 g/dL Final   • ALT (SGPT) 04/23/2019 40  <=50 U/L Final   • AST (SGOT) 04/23/2019 28  17 - 59 U/L Final   • Alkaline Phosphatase 04/23/2019 70  38 - 126 U/L Final   • Total Bilirubin 04/23/2019 0.9  0.2 - 1.3 mg/dL Final   • eGFR Non African Amer 04/23/2019 76  70 - 162 mL/min/1.73 Final   • Globulin 04/23/2019 2.8  2.3 - 3.5 gm/dL Final   • A/G Ratio 04/23/2019 1.6  1.1 - 1.8 g/dL Final   • BUN/Creatinine Ratio 04/23/2019 11.9  7.0 - 25.0 Final   • Anion Gap 04/23/2019 8.0  5.0 - 15.0 mmol/L Final   • Total Cholesterol 04/23/2019 186  150 - 200 mg/dL Final   • Triglycerides 04/23/2019 147  <=150 mg/dL Final   • HDL Cholesterol 04/23/2019 44  40 - 59 mg/dL Final   • LDL Cholesterol  04/23/2019 113* <=100 mg/dL Final   • VLDL Cholesterol 04/23/2019 29.4  mg/dL Final   • LDL/HDL Ratio 04/23/2019 2.56  0.00 - 3.55 Final   • TSH 04/23/2019 2.610  0.270 - 4.200 mIU/mL Final   • Testosterone, Total 04/23/2019 471  264 - 916 ng/dL Final    Adult male reference interval is based on a population of  healthy nonobese males (BMI <30) between 19 and 39 years old.  Buffy et.al. JCEM 2017,102;0259-9912. PMID: [id number].   • Testosterone, Free 04/23/2019 11.7  8.7 - 25.1 pg/mL Final   ]

## 2019-10-29 ENCOUNTER — LAB (OUTPATIENT)
Dept: LAB | Facility: OTHER | Age: 39
End: 2019-10-29

## 2019-10-29 DIAGNOSIS — K21.9 GASTROESOPHAGEAL REFLUX DISEASE WITHOUT ESOPHAGITIS: ICD-10-CM

## 2019-10-29 DIAGNOSIS — E66.09 CLASS 1 OBESITY DUE TO EXCESS CALORIES WITHOUT SERIOUS COMORBIDITY WITH BODY MASS INDEX (BMI) OF 34.0 TO 34.9 IN ADULT: Chronic | ICD-10-CM

## 2019-10-29 DIAGNOSIS — E29.1 MALE HYPOGONADISM: Chronic | ICD-10-CM

## 2019-10-29 LAB
ALBUMIN SERPL-MCNC: 4.7 G/DL (ref 3.5–5)
ALBUMIN/GLOB SERPL: 1.7 G/DL (ref 1.1–1.8)
ALP SERPL-CCNC: 64 U/L (ref 38–126)
ALT SERPL W P-5'-P-CCNC: 59 U/L
ANION GAP SERPL CALCULATED.3IONS-SCNC: 11 MMOL/L (ref 5–15)
AST SERPL-CCNC: 39 U/L (ref 17–59)
BASOPHILS # BLD AUTO: 0.08 10*3/MM3 (ref 0–0.2)
BASOPHILS NFR BLD AUTO: 0.9 % (ref 0–1.5)
BILIRUB SERPL-MCNC: 1.2 MG/DL (ref 0.2–1.3)
BUN BLD-MCNC: 16 MG/DL (ref 7–23)
BUN/CREAT SERPL: 15.5 (ref 7–25)
CALCIUM SPEC-SCNC: 9 MG/DL (ref 8.4–10.2)
CHLORIDE SERPL-SCNC: 102 MMOL/L (ref 101–112)
CO2 SERPL-SCNC: 29 MMOL/L (ref 22–30)
CREAT BLD-MCNC: 1.03 MG/DL (ref 0.7–1.3)
DEPRECATED RDW RBC AUTO: 45.6 FL (ref 37–54)
EOSINOPHIL # BLD AUTO: 0.17 10*3/MM3 (ref 0–0.4)
EOSINOPHIL NFR BLD AUTO: 2 % (ref 0.3–6.2)
ERYTHROCYTE [DISTWIDTH] IN BLOOD BY AUTOMATED COUNT: 13.6 % (ref 12.3–15.4)
GFR SERPL CREATININE-BSD FRML MDRD: 81 ML/MIN/1.73 (ref 70–162)
GLOBULIN UR ELPH-MCNC: 2.8 GM/DL (ref 2.3–3.5)
GLUCOSE BLD-MCNC: 87 MG/DL (ref 70–99)
HCT VFR BLD AUTO: 51.1 % (ref 37.5–51)
HGB BLD-MCNC: 17.5 G/DL (ref 13–17.7)
LYMPHOCYTES # BLD AUTO: 1.31 10*3/MM3 (ref 0.7–3.1)
LYMPHOCYTES NFR BLD AUTO: 15.1 % (ref 19.6–45.3)
MCH RBC QN AUTO: 31.9 PG (ref 26.6–33)
MCHC RBC AUTO-ENTMCNC: 34.2 G/DL (ref 31.5–35.7)
MCV RBC AUTO: 93.2 FL (ref 79–97)
MONOCYTES # BLD AUTO: 0.74 10*3/MM3 (ref 0.1–0.9)
MONOCYTES NFR BLD AUTO: 8.5 % (ref 5–12)
NEUTROPHILS # BLD AUTO: 6.37 10*3/MM3 (ref 1.7–7)
NEUTROPHILS NFR BLD AUTO: 73.5 % (ref 42.7–76)
PLATELET # BLD AUTO: 279 10*3/MM3 (ref 140–450)
PMV BLD AUTO: 10.4 FL (ref 6–12)
POTASSIUM BLD-SCNC: 4.6 MMOL/L (ref 3.4–5)
PROT SERPL-MCNC: 7.5 G/DL (ref 6.3–8.6)
RBC # BLD AUTO: 5.48 10*6/MM3 (ref 4.14–5.8)
SODIUM BLD-SCNC: 142 MMOL/L (ref 137–145)
WBC NRBC COR # BLD: 8.67 10*3/MM3 (ref 3.4–10.8)

## 2019-10-29 PROCEDURE — 36415 COLL VENOUS BLD VENIPUNCTURE: CPT | Performed by: INTERNAL MEDICINE

## 2019-10-29 PROCEDURE — 85025 COMPLETE CBC W/AUTO DIFF WBC: CPT | Performed by: INTERNAL MEDICINE

## 2019-10-29 PROCEDURE — 84403 ASSAY OF TOTAL TESTOSTERONE: CPT | Performed by: INTERNAL MEDICINE

## 2019-10-29 PROCEDURE — 80053 COMPREHEN METABOLIC PANEL: CPT | Performed by: INTERNAL MEDICINE

## 2019-10-29 PROCEDURE — 84402 ASSAY OF FREE TESTOSTERONE: CPT | Performed by: INTERNAL MEDICINE

## 2019-11-01 LAB
TESTOST FREE SERPL-MCNC: 17.7 PG/ML (ref 8.7–25.1)
TESTOST SERPL-MCNC: 682 NG/DL (ref 264–916)

## 2019-11-06 ENCOUNTER — OFFICE VISIT (OUTPATIENT)
Dept: FAMILY MEDICINE CLINIC | Facility: CLINIC | Age: 39
End: 2019-11-06

## 2019-11-06 VITALS
TEMPERATURE: 98.4 F | BODY MASS INDEX: 34.98 KG/M2 | HEIGHT: 69 IN | DIASTOLIC BLOOD PRESSURE: 86 MMHG | HEART RATE: 72 BPM | SYSTOLIC BLOOD PRESSURE: 126 MMHG | WEIGHT: 236.2 LBS

## 2019-11-06 DIAGNOSIS — R79.89 ELEVATED LFTS: ICD-10-CM

## 2019-11-06 DIAGNOSIS — D75.1 POLYCYTHEMIA: ICD-10-CM

## 2019-11-06 DIAGNOSIS — G47.33 OBSTRUCTIVE SLEEP APNEA SYNDROME: ICD-10-CM

## 2019-11-06 DIAGNOSIS — J30.1 ACUTE SEASONAL ALLERGIC RHINITIS DUE TO POLLEN: Chronic | ICD-10-CM

## 2019-11-06 DIAGNOSIS — E78.1 HYPERTRIGLYCERIDEMIA: Chronic | ICD-10-CM

## 2019-11-06 DIAGNOSIS — E66.09 CLASS 1 OBESITY DUE TO EXCESS CALORIES WITHOUT SERIOUS COMORBIDITY WITH BODY MASS INDEX (BMI) OF 34.0 TO 34.9 IN ADULT: Chronic | ICD-10-CM

## 2019-11-06 DIAGNOSIS — K21.9 GASTROESOPHAGEAL REFLUX DISEASE WITHOUT ESOPHAGITIS: ICD-10-CM

## 2019-11-06 DIAGNOSIS — R53.83 FATIGUE, UNSPECIFIED TYPE: ICD-10-CM

## 2019-11-06 DIAGNOSIS — E29.1 MALE HYPOGONADISM: Primary | Chronic | ICD-10-CM

## 2019-11-06 PROCEDURE — 99214 OFFICE O/P EST MOD 30 MIN: CPT | Performed by: INTERNAL MEDICINE

## 2019-11-06 RX ORDER — TESTOSTERONE CYPIONATE 200 MG/ML
200 INJECTION, SOLUTION INTRAMUSCULAR
Qty: 10 ML | Refills: 5 | Status: SHIPPED | OUTPATIENT
Start: 2019-11-06 | End: 2020-06-09 | Stop reason: SDUPTHER

## 2019-11-06 NOTE — PATIENT INSTRUCTIONS
Exercising to Lose Weight  Exercise is structured, repetitive physical activity to improve fitness and health. Getting regular exercise is important for everyone. It is especially important if you are overweight. Being overweight increases your risk of heart disease, stroke, diabetes, high blood pressure, and several types of cancer. Reducing your calorie intake and exercising can help you lose weight.  Exercise is usually categorized as moderate or vigorous intensity. To lose weight, most people need to do a certain amount of moderate-intensity or vigorous-intensity exercise each week.  Moderate-intensity exercise    Moderate-intensity exercise is any activity that gets you moving enough to burn at least three times more energy (calories) than if you were sitting.  Examples of moderate exercise include:  · Walking a mile in 15 minutes.  · Doing light yard work.  · Biking at an easy pace.  Most people should get at least 150 minutes (2 hours and 30 minutes) a week of moderate-intensity exercise to maintain their body weight.  Vigorous-intensity exercise  Vigorous-intensity exercise is any activity that gets you moving enough to burn at least six times more calories than if you were sitting. When you exercise at this intensity, you should be working hard enough that you are not able to carry on a conversation.  Examples of vigorous exercise include:  · Running.  · Playing a team sport, such as football, basketball, and soccer.  · Jumping rope.  Most people should get at least 75 minutes (1 hour and 15 minutes) a week of vigorous-intensity exercise to maintain their body weight.  How can exercise affect me?  When you exercise enough to burn more calories than you eat, you lose weight. Exercise also reduces body fat and builds muscle. The more muscle you have, the more calories you burn. Exercise also:  · Improves mood.  · Reduces stress and tension.  · Improves your overall fitness, flexibility, and  endurance.  · Increases bone strength.  The amount of exercise you need to lose weight depends on:  · Your age.  · The type of exercise.  · Any health conditions you have.  · Your overall physical ability.  Talk to your health care provider about how much exercise you need and what types of activities are safe for you.  What actions can I take to lose weight?  Nutrition    · Make changes to your diet as told by your health care provider or diet and nutrition specialist (dietitian). This may include:  ? Eating fewer calories.  ? Eating more protein.  ? Eating less unhealthy fats.  ? Eating a diet that includes fresh fruits and vegetables, whole grains, low-fat dairy products, and lean protein.  ? Avoiding foods with added fat, salt, and sugar.  · Drink plenty of water while you exercise to prevent dehydration or heat stroke.  Activity  · Choose an activity that you enjoy and set realistic goals. Your health care provider can help you make an exercise plan that works for you.  · Exercise at a moderate or vigorous intensity most days of the week.  ? The intensity of exercise may vary from person to person. You can tell how intense a workout is for you by paying attention to your breathing and heartbeat. Most people will notice their breathing and heartbeat get faster with more intense exercise.  · Do resistance training twice each week, such as:  ? Push-ups.  ? Sit-ups.  ? Lifting weights.  ? Using resistance bands.  · Getting short amounts of exercise can be just as helpful as long structured periods of exercise. If you have trouble finding time to exercise, try to include exercise in your daily routine.  ? Get up, stretch, and walk around every 30 minutes throughout the day.  ? Go for a walk during your lunch break.  ? Park your car farther away from your destination.  ? If you take public transportation, get off one stop early and walk the rest of the way.  ? Make phone calls while standing up and walking  around.  ? Take the stairs instead of elevators or escalators.  · Wear comfortable clothes and shoes with good support.  · Do not exercise so much that you hurt yourself, feel dizzy, or get very short of breath.  Where to find more information  · [place] Department of Health and Human Services: www.hhs.gov  · Centers for Disease Control and Prevention (CDC): www.cdc.gov  Contact a health care provider:  · Before starting a new exercise program.  · If you have questions or concerns about your weight.  · If you have a medical problem that keeps you from exercising.  Get help right away if you have any of the following while exercising:  · Injury.  · Dizziness.  · Difficulty breathing or shortness of breath that does not go away when you stop exercising.  · Chest pain.  · Rapid heartbeat.  Summary  · Being overweight increases your risk of heart disease, stroke, diabetes, high blood pressure, and several types of cancer.  · Losing weight happens when you burn more calories than you eat.  · Reducing the amount of calories you eat in addition to getting regular moderate or vigorous exercise each week helps you lose weight.  This information is not intended to replace advice given to you by your health care provider. Make sure you discuss any questions you have with your health care provider.  Document Released: 01/20/2012 Document Revised: 12/31/2018 Document Reviewed: 12/31/2018  [employer] Interactive Patient Education © 2019 [employer]      Calorie Counting for Weight Loss  Calories are units of energy. Your body needs a certain amount of calories from food to keep you going throughout the day. When you eat more calories than your body needs, your body stores the extra calories as fat. When you eat fewer calories than your body needs, your body burns fat to get the energy it needs.  Calorie counting means keeping track of how many calories you eat and drink each day. Calorie counting can be helpful if you need to lose  weight. If you make sure to eat fewer calories than your body needs, you should lose weight. Ask your health care provider what a healthy weight is for you.  For calorie counting to work, you will need to eat the right number of calories in a day in order to lose a healthy amount of weight per week. A dietitian can help you determine how many calories you need in a day and will give you suggestions on how to reach your calorie goal.  · A healthy amount of weight to lose per week is usually 1-2 lb (0.5-0.9 kg). This usually means that your daily calorie intake should be reduced by 500-750 calories.  · Eating 1,200 - 1,500 calories per day can help most women lose weight.  · Eating 1,500 - 1,800 calories per day can help most men lose weight.  What is my plan?  My goal is to have __________ calories per day.  If I have this many calories per day, I should lose around __________ pounds per week.  What do I need to know about calorie counting?  In order to meet your daily calorie goal, you will need to:  · Find out how many calories are in each food you would like to eat. Try to do this before you eat.  · Decide how much of the food you plan to eat.  · Write down what you ate and how many calories it had. Doing this is called keeping a food log.  To successfully lose weight, it is important to balance calorie counting with a healthy lifestyle that includes regular activity. Aim for 150 minutes of moderate exercise (such as walking) or 75 minutes of vigorous exercise (such as running) each week.  Where do I find calorie information?    The number of calories in a food can be found on a Nutrition Facts label. If a food does not have a Nutrition Facts label, try to look up the calories online or ask your dietitian for help.  Remember that calories are listed per serving. If you choose to have more than one serving of a food, you will have to multiply the calories per serving by the amount of servings you plan to eat. For  "example, the label on a package of bread might say that a serving size is 1 slice and that there are 90 calories in a serving. If you eat 1 slice, you will have eaten 90 calories. If you eat 2 slices, you will have eaten 180 calories.  How do I keep a food log?  Immediately after each meal, record the following information in your food log:  · What you ate. Don't forget to include toppings, sauces, and other extras on the food.  · How much you ate. This can be measured in cups, ounces, or number of items.  · How many calories each food and drink had.  · The total number of calories in the meal.  Keep your food log near you, such as in a small notebook in your pocket, or use a mobile kip or website. Some programs will calculate calories for you and show you how many calories you have left for the day to meet your goal.  What are some calorie counting tips?    · Use your calories on foods and drinks that will fill you up and not leave you hungry:  ? Some examples of foods that fill you up are nuts and nut butters, vegetables, lean proteins, and high-fiber foods like whole grains. High-fiber foods are foods with more than 5 g fiber per serving.  ? Drinks such as sodas, specialty coffee drinks, alcohol, and juices have a lot of calories, yet do not fill you up.  · Eat nutritious foods and avoid empty calories. Empty calories are calories you get from foods or beverages that do not have many vitamins or protein, such as candy, sweets, and soda. It is better to have a nutritious high-calorie food (such as an avocado) than a food with few nutrients (such as a bag of chips).  · Know how many calories are in the foods you eat most often. This will help you calculate calorie counts faster.  · Pay attention to calories in drinks. Low-calorie drinks include water and unsweetened drinks.  · Pay attention to nutrition labels for \"low fat\" or \"fat free\" foods. These foods sometimes have the same amount of calories or more calories " than the full fat versions. They also often have added sugar, starch, or salt, to make up for flavor that was removed with the fat.  · Find a way of tracking calories that works for you. Get creative. Try different apps or programs if writing down calories does not work for you.  What are some portion control tips?  · Know how many calories are in a serving. This will help you know how many servings of a certain food you can have.  · Use a measuring cup to measure serving sizes. You could also try weighing out portions on a kitchen scale. With time, you will be able to estimate serving sizes for some foods.  · Take some time to put servings of different foods on your favorite plates, bowls, and cups so you know what a serving looks like.  · Try not to eat straight from a bag or box. Doing this can lead to overeating. Put the amount you would like to eat in a cup or on a plate to make sure you are eating the right portion.  · Use smaller plates, glasses, and bowls to prevent overeating.  · Try not to multitask (for example, watch TV or use your computer) while eating. If it is time to eat, sit down at a table and enjoy your food. This will help you to know when you are full. It will also help you to be aware of what you are eating and how much you are eating.  What are tips for following this plan?  Reading food labels  · Check the calorie count compared to the serving size. The serving size may be smaller than what you are used to eating.  · Check the source of the calories. Make sure the food you are eating is high in vitamins and protein and low in saturated and trans fats.  Shopping  · Read nutrition labels while you shop. This will help you make healthy decisions before you decide to purchase your food.  · Make a grocery list and stick to it.  Cooking  · Try to cook your favorite foods in a healthier way. For example, try baking instead of frying.  · Use low-fat dairy products.  Meal planning  · Use more fruits  "and vegetables. Half of your plate should be fruits and vegetables.  · Include lean proteins like poultry and fish.  How do I count calories when eating out?  · Ask for smaller portion sizes.  · Consider sharing an entree and sides instead of getting your own entree.  · If you get your own entree, eat only half. Ask for a box at the beginning of your meal and put the rest of your entree in it so you are not tempted to eat it.  · If calories are listed on the menu, choose the lower calorie options.  · Choose dishes that include vegetables, fruits, whole grains, low-fat dairy products, and lean protein.  · Choose items that are boiled, broiled, grilled, or steamed. Stay away from items that are buttered, battered, fried, or served with cream sauce. Items labeled \"crispy\" are usually fried, unless stated otherwise.  · Choose water, low-fat milk, unsweetened iced tea, or other drinks without added sugar. If you want an alcoholic beverage, choose a lower calorie option such as a glass of wine or light beer.  · Ask for dressings, sauces, and syrups on the side. These are usually high in calories, so you should limit the amount you eat.  · If you want a salad, choose a garden salad and ask for grilled meats. Avoid extra toppings like thomas, cheese, or fried items. Ask for the dressing on the side, or ask for olive oil and vinegar or lemon to use as dressing.  · Estimate how many servings of a food you are given. For example, a serving of cooked rice is ½ cup or about the size of half a baseball. Knowing serving sizes will help you be aware of how much food you are eating at restaurants. The list below tells you how big or small some common portion sizes are based on everyday objects:  ? 1 oz--4 stacked dice.  ? 3 oz--1 deck of cards.  ? 1 tsp--1 die.  ? 1 Tbsp--½ a ping-pong ball.  ? 2 Tbsp--1 ping-pong ball.  ? ½ cup--½ baseball.  ? 1 cup--1 baseball.  Summary  · Calorie counting means keeping track of how many calories " you eat and drink each day. If you eat fewer calories than your body needs, you should lose weight.  · A healthy amount of weight to lose per week is usually 1-2 lb (0.5-0.9 kg). This usually means reducing your daily calorie intake by 500-750 calories.  · The number of calories in a food can be found on a Nutrition Facts label. If a food does not have a Nutrition Facts label, try to look up the calories online or ask your dietitian for help.  · Use your calories on foods and drinks that will fill you up, and not on foods and drinks that will leave you hungry.  · Use smaller plates, glasses, and bowls to prevent overeating.  This information is not intended to replace advice given to you by your health care provider. Make sure you discuss any questions you have with your health care provider.  Document Released: 12/18/2006 Document Revised: 09/06/2019 Document Reviewed: 11/17/2017  [employer] Interactive Patient Education © 2019 [employer]

## 2019-11-06 NOTE — PROGRESS NOTES
Subjective     History of Present Illness     Zheng Viera is a 39 y.o. male who comes in for a follow up on hypertriglyceridemia, obesity, and hypogonadism, for which he continues on testosterone injections   He uses Xyzal and Flonase for episodic flares of seasonal allergies, but has not required them recently.  GERD symptoms adequately managed with occasional flares, for which he uses OTC antacids.    He states his wife reports he is snoring loudly and he has also awakened due to apneic episodes and is interested in a sleep study.  We will refer to Dr. Donavon Prieto.           He has started taking 3000 units of B-12 twice daily.  We did not check a level with this set of labs, although, his level was 409 in December 2019.  I recommended he decrease B-12 to three times weekly and we will check a B-12 level with next set of labs.      Weight is up 2 pounds in the past six months.   With the weight gain, ALT slightly above goal.  We discussed benefits of weight loss.  Blood pressure and heart rate at goal.      He feels energy level is good with the current testosterone dose.  Testosterone level is a little higher than normal with the weekly 0.5 ml of testosterone. CBC reveals evidence of polycythemia with hemoglobin 17.5.  Platelets remain at goal.  I recommended he consider donating blood every three months.       The patient's relevant past medical, surgical, and social history was reviewed in Epic.   Lab results are reviewed with the patient today.  CBC unremarkable. Fasting glucose 87. Normal renal function.  ALT above goal at 59 increased from 40 six months ago.     Review of Systems   Constitutional: Negative for chills, fatigue and fever.   HENT: Negative for congestion, ear pain, postnasal drip, sinus pressure and sore throat.    Respiratory: Negative for cough, shortness of breath and wheezing.    Cardiovascular: Negative for chest pain, palpitations and leg swelling.   Gastrointestinal: Negative for  "abdominal pain, blood in stool, constipation, diarrhea, nausea and vomiting.   Endocrine: Negative for cold intolerance, heat intolerance, polydipsia and polyuria.   Genitourinary: Negative for dysuria, frequency, hematuria and urgency.   Skin: Negative for rash.   Neurological: Negative for syncope and weakness.        Objective     Visit Vitals  /86   Pulse 72   Temp 98.4 °F (36.9 °C) (Oral)   Ht 175.3 cm (69\")   Wt 107 kg (236 lb 3.2 oz)   BMI 34.88 kg/m²     Physical Exam   Constitutional: He is oriented to person, place, and time. He appears well-developed and well-nourished. No distress.   Obese male. Muscular male.    HENT:   Head: Normocephalic and atraumatic.   Nose: Right sinus exhibits no maxillary sinus tenderness and no frontal sinus tenderness. Left sinus exhibits no maxillary sinus tenderness and no frontal sinus tenderness.   Mouth/Throat: Uvula is midline, oropharynx is clear and moist and mucous membranes are normal. No oral lesions. No tonsillar exudate.   Mildly crowded posterior oropharynx.     Eyes: Conjunctivae and EOM are normal. Pupils are equal, round, and reactive to light.   Neck: Trachea normal. Neck supple. No JVD present. Carotid bruit is not present. No tracheal deviation present. No thyroid mass and no thyromegaly present.   Cardiovascular: Normal rate, regular rhythm, normal heart sounds and intact distal pulses.  No extrasystoles are present. PMI is not displaced.   No murmur heard.  Pulmonary/Chest: Effort normal and breath sounds normal. No accessory muscle usage. No respiratory distress. He has no decreased breath sounds. He has no wheezes. He has no rhonchi. He has no rales.   Abdominal: Soft. Bowel sounds are normal. He exhibits no distension. There is no hepatosplenomegaly. There is no tenderness.     Vascular Status -  His right foot exhibits normal foot vasculature  and no edema. His left foot exhibits normal foot vasculature  and no edema.  Lymphadenopathy:     He " has no cervical adenopathy.   Neurological: He is alert and oriented to person, place, and time. No cranial nerve deficit. Coordination normal.   Skin: Skin is warm, dry and intact. No rash noted. No cyanosis. Nails show no clubbing.   Psychiatric: He has a normal mood and affect. His speech is normal and behavior is normal. Judgment and thought content normal.   Vitals reviewed.      Assessment/Plan      Refer to Dr. Donavon Prieto to evaluate for new problem of obstructive sleep apnea.      I recommended he take the 1/2 ml testosterone injections approximately every 10 days instead of 7 for the hypogonadism, due to the increased Hg.  We will continue to monitor hemoglobin and platelets closely with the testosterone replacement.  Current labs reveal new problem of mild polycythemia, for which I recommended he donate blood approximately every three months.     Decrease the vitamin B-12 3000 mcg to three times weekly.  We will check B-12 with next set of labs.     Recommended 250 daily calorie deficit and increased exercise to achieve gradual weight loss with a weight loss goal of 20 pounds, which would also improve fatty liver.     Return in six months for follow up with fasting labs one week prior or sooner if needed.       Scribed for Dr. Benitez by Ashlie Domínguez [facility].     Diagnoses and all orders for this visit:    Male hypogonadism  -     Testosterone, Free, Total; Future    Acute seasonal allergic rhinitis due to pollen    Class 1 obesity due to excess calories without serious comorbidity with body mass index (BMI) of 34.0 to 34.9 in adult    Gastroesophageal reflux disease without esophagitis    Obstructive sleep apnea syndrome  -     Ambulatory Referral to Sleep Medicine    Hypertriglyceridemia - mild  -     CBC Auto Differential; Future  -     Comprehensive Metabolic Panel; Future  -     Lipid Panel; Future    Fatigue, unspecified type  -     Vitamin B12; Future  -     TSH;  Future    Polycythemia    Elevated LFTs    Other orders  -     Testosterone Cypionate (DEPO-TESTOSTERONE) 200 MG/ML injection; Inject 1 mL into the appropriate muscle as directed by prescriber Every 14 (Fourteen) Days.        Lab on 10/29/2019   Component Date Value Ref Range Status   • Glucose 10/29/2019 87  70 - 99 mg/dL Final   • BUN 10/29/2019 16  7 - 23 mg/dL Final   • Creatinine 10/29/2019 1.03  0.70 - 1.30 mg/dL Final   • Sodium 10/29/2019 142  137 - 145 mmol/L Final   • Potassium 10/29/2019 4.6  3.4 - 5.0 mmol/L Final   • Chloride 10/29/2019 102  101 - 112 mmol/L Final   • CO2 10/29/2019 29.0  22.0 - 30.0 mmol/L Final   • Calcium 10/29/2019 9.0  8.4 - 10.2 mg/dL Final   • Total Protein 10/29/2019 7.5  6.3 - 8.6 g/dL Final   • Albumin 10/29/2019 4.70  3.50 - 5.00 g/dL Final   • ALT (SGPT) 10/29/2019 59* <=50 U/L Final   • AST (SGOT) 10/29/2019 39  17 - 59 U/L Final   • Alkaline Phosphatase 10/29/2019 64  38 - 126 U/L Final   • Total Bilirubin 10/29/2019 1.2  0.2 - 1.3 mg/dL Final   • eGFR Non African Amer 10/29/2019 81  70 - 162 mL/min/1.73 Final   • Globulin 10/29/2019 2.8  2.3 - 3.5 gm/dL Final   • A/G Ratio 10/29/2019 1.7  1.1 - 1.8 g/dL Final   • BUN/Creatinine Ratio 10/29/2019 15.5  7.0 - 25.0 Final   • Anion Gap 10/29/2019 11.0  5.0 - 15.0 mmol/L Final   • WBC 10/29/2019 8.67  3.40 - 10.80 10*3/mm3 Final   • RBC 10/29/2019 5.48  4.14 - 5.80 10*6/mm3 Final   • Hemoglobin 10/29/2019 17.5  13.0 - 17.7 g/dL Final   • Hematocrit 10/29/2019 51.1* 37.5 - 51.0 % Final   • MCV 10/29/2019 93.2  79.0 - 97.0 fL Final   • MCH 10/29/2019 31.9  26.6 - 33.0 pg Final   • MCHC 10/29/2019 34.2  31.5 - 35.7 g/dL Final   • RDW 10/29/2019 13.6  12.3 - 15.4 % Final   • RDW-SD 10/29/2019 45.6  37.0 - 54.0 fl Final   • MPV 10/29/2019 10.4  6.0 - 12.0 fL Final   • Platelets 10/29/2019 279  140 - 450 10*3/mm3 Final   • Neutrophil % 10/29/2019 73.5  42.7 - 76.0 % Final   • Lymphocyte % 10/29/2019 15.1* 19.6 - 45.3 % Final   •  Monocyte % 10/29/2019 8.5  5.0 - 12.0 % Final   • Eosinophil % 10/29/2019 2.0  0.3 - 6.2 % Final   • Basophil % 10/29/2019 0.9  0.0 - 1.5 % Final   • Neutrophils, Absolute 10/29/2019 6.37  1.70 - 7.00 10*3/mm3 Final   • Lymphocytes, Absolute 10/29/2019 1.31  0.70 - 3.10 10*3/mm3 Final   • Monocytes, Absolute 10/29/2019 0.74  0.10 - 0.90 10*3/mm3 Final   • Eosinophils, Absolute 10/29/2019 0.17  0.00 - 0.40 10*3/mm3 Final   • Basophils, Absolute 10/29/2019 0.08  0.00 - 0.20 10*3/mm3 Final   • Testosterone, Total 10/29/2019 682  264 - 916 ng/dL Final    Adult male reference interval is based on a population of  healthy nonobese males (BMI <30) between 19 and 39 years old.  Buffy et.al. JCEM 2017,102;2434-1133. PMID: 26129777.   • Testosterone, Free 10/29/2019 17.7  8.7 - 25.1 pg/mL Final   ]

## 2019-11-07 ENCOUNTER — PRIOR AUTHORIZATION (OUTPATIENT)
Dept: FAMILY MEDICINE CLINIC | Facility: CLINIC | Age: 39
End: 2019-11-07

## 2019-11-07 NOTE — TELEPHONE ENCOUNTER
RUBI MEEKS (Key: [id number]) – [id number]  Testosterone Cypionate 200MG/ML solution  Status: PA Response - Approved  Created: November 6th, 2019 [phone]  Sent: November 7th, 2019    Archived - Outcome: Approved

## 2019-11-12 ENCOUNTER — CONSULT (OUTPATIENT)
Dept: SLEEP MEDICINE | Facility: HOSPITAL | Age: 39
End: 2019-11-12

## 2019-11-12 VITALS
BODY MASS INDEX: 35.24 KG/M2 | HEIGHT: 69 IN | SYSTOLIC BLOOD PRESSURE: 150 MMHG | OXYGEN SATURATION: 98 % | HEART RATE: 104 BPM | DIASTOLIC BLOOD PRESSURE: 103 MMHG | WEIGHT: 237.9 LBS

## 2019-11-12 DIAGNOSIS — G47.33 OBSTRUCTIVE SLEEP APNEA, ADULT: Primary | ICD-10-CM

## 2019-11-12 PROCEDURE — 99203 OFFICE O/P NEW LOW 30 MIN: CPT | Performed by: INTERNAL MEDICINE

## 2019-11-12 NOTE — PROGRESS NOTES
New Patient Sleep Medicine Consultation    Encounter Date: 11/12/2019         Patient's PCP: Chris Benitez MD  Referring provider: Chris Benitez MD  Reason for consultation chief complaint: snoring, awakening gasping for breath, witnessed apneas, excessive daytime sleepiness and unrefreshing sleep    Zheng Viera is a 39 y.o. male whose bedtime is ~ 2230. He  falls asleep after 1 minutes, and is up 1 times per night. He wakes up ~ [id number]. He endorses 7 hours of sleep. He drinks 24 cups of coffee, 0 teas, and 32 sodas per day. He drinks 7 alcoholic beverages per week.    Zheng Viera admits to snoring, unrestful sleep, gasping during sleep, excessive daytime sleepiness, Disturbed or restless sleep, Up to the bathroom at night and restless legs at night. He denies cataplexy or hypnagogic hallucinations. He does not take sedatives or hypnotics. He has some sleepiness with driving. He naps when unstimulated. He is a never smoker. He reports some sleep paralysis.    Past Medical History:   Diagnosis Date   • Class 1 obesity due to excess calories without serious comorbidity with body mass index (BMI) of 34.0 to 34.9 in adult 4/24/2018   • Grade I hemorrhoids 4/24/2018   • Hypertriglyceridemia - mild 4/24/2018   • Male hypogonadism 5/18/2018     Social History     Socioeconomic History   • Marital status:      Spouse name: Not on file   • Number of children: Not on file   • Years of education: Not on file   • Highest education level: Not on file   Tobacco Use   • Smoking status: Never Smoker   • Smokeless tobacco: Never Used   Substance and Sexual Activity   • Alcohol use: No     Comment: social drinker   • Drug use: No   • Sexual activity: Defer   , 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  0 brothers and 1 sisters  Other family history + for: none listed  FH of sleep disorders: none    [place] - 13    Review of Systems:  Constitutional: negative  Eyes: negative  Ears,  "nose, mouth, throat, and face: negative  Respiratory: negative  Cardiovascular: negative  Gastrointestinal: negative  Genitourinary:negative  Integument/breast: negative  Hematologic/lymphatic: negative  Musculoskeletal:negative  Neurological: negative  Behavioral/Psych: negative  Endocrine: negative  Allergic/Immunologic: negative Patient advised to discuss any positive ROS with PCP.      Vitals:    11/12/19 1106   BP: (!) 150/103   Pulse: 104   SpO2: 98%           11/12/19  1106   Weight: 108 kg (237 lb 14.4 oz)       Body mass index is 35.12 kg/m². Patient's Body mass index is 35.12 kg/m². BMI is above normal parameters. Recommendations include: referral to primary care.  Neck circumference: 17.5\"          General: Alert. Cooperative. Well developed. No acute distress.             Head:  Normocephalic. Symmetrical. Atraumatic.              Eyes: Sclera clear. No icterus. PERRLA. Normal EOMI, widely spaced eyes             Ears: No deformities. Normal hearing.             Nose: No septal deviation. No drainage.          Throat: No oral lesions. No thrush. Moist mucous membranes. Trachea midline    Tongue is enlarged    Dentition is good       Pharynx: Posterior pharyngeal pillars are narrow    Mallampati score of IV (only hard palate visible)    Pharynx is normal with unrermarkable tonsils   Chest Wall:  Normal shape. Symmetric expansion with respiration. No tenderness.          Lungs:  Clear to auscultation bilaterally. No wheezes. No rhonchi. No rales. Respirations regular, even and unlabored.            Heart:  Regular rhythm and normal rate. Normal S1 and S2. No murmur.     Abdomen:  Soft, non-tender and non-distended. Normal bowel sounds. No masses.  Extremities:  Moves all extremities well. No edema.           Pulses: Pulses palpable and equal bilaterally.               Skin: Dry. Intact. No bleeding. No rash.           Neuro: Moves all 4 extremities and cranial nerves grossly intact.  Psychiatric: Normal " "mood and affect.      Current Outpatient Medications:   •  fluticasone (FLONASE) 50 MCG/ACT nasal spray, 1 spray into each nostril., Disp: , Rfl:   •  Levocetirizine Dihydrochloride (XYZAL PO), Take  by mouth., Disp: , Rfl:   •  Needle, Disp, (BD HYPODERMIC NEEDLE) 18G X 1\" misc, Use to draw up testosterone, Disp: 25 each, Rfl: 1  •  Needle, Disp, (BD SAFETYGLIDE SHIELDED NEEDLE) 25G X 1\" misc, Use up to 4 times a month, Disp: 50 each, Rfl: 1  •  Syringe/Needle, Disp, (B-D SYRINGE/NEEDLE 3CC/23GX1\") 23G X 1\" 3 ML misc, Use twice monthly with testosterone injections, Disp: 25 each, Rfl: 1  •  Testosterone Cypionate (DEPO-TESTOSTERONE) 200 MG/ML injection, Inject 1 mL into the appropriate muscle as directed by prescriber Every 14 (Fourteen) Days., Disp: 10 mL, Rfl: 5    Current Facility-Administered Medications:   •  Testosterone Cypionate (DEPOTESTOTERONE CYPIONATE) injection 200 mg, 200 mg, Intramuscular, Q14 Days, Chris Benitez MD, 200 mg at 06/24/19 1028    Lab Results   Component Value Date    WBC 8.67 10/29/2019    HGB 17.5 10/29/2019    HCT 51.1 (H) 10/29/2019    MCV 93.2 10/29/2019     10/29/2019     Lab Results   Component Value Date    GLUCOSE 87 10/29/2019    CALCIUM 9.0 10/29/2019     10/29/2019    K 4.6 10/29/2019    CO2 29.0 10/29/2019     10/29/2019    BUN 16 10/29/2019    CREATININE 1.03 10/29/2019    EGFRIFNONA 81 10/29/2019    BCR 15.5 10/29/2019    ANIONGAP 11.0 10/29/2019     No results found for: INR, PROTIME  No results found for: CKTOTAL, CKMB, CKMBINDEX, TROPONINI, TROPONINT    No results found for: PHART, GOH7IAU, PO2ART]    Contraindications to home sleep test: polycythemia    Assessment and Plan:    1. Obstructive sleep apnea stable chronic illness and New (to me), additional work-up planned (4)  1. Check split night PSG  2. RTC in 2 weeks for results  2. HTN - BP reads high on automatic machine. Normally 120 systolic. This has been a trend with this new equipment, so I " am unsure how accurate the numbers are.  1. Patient is asymptomatic, however, I have advised he to go to ED if sx develop and follow up with PCP in 1 week \  3. Polycythemia - stable chronic illness and New (to me), no additional work-up planned (3)   4. Hypogonadism - stable chronic illness and New (to me), additional work-up planned (4)   1. Given his long car rides, polycythemia, and most likely untreated BUCK, patient may be higher risk for DVT with testosterone replacement.  I counseled the patient about stopping the medication and inform his primary care physician.    Total visit time 30 min, with total of 20 minutes spent face-to-face counseling patient extensively regarding: PSG testing and sleep hygiene and narcolepsy      RTC 2 weeks after testing         This document has been electronically signed by Donavon Prieto MD on November 12, 2019         CC: Chris Benitez MD Bandy, Eric L, MD

## 2019-11-24 ENCOUNTER — HOSPITAL ENCOUNTER (OUTPATIENT)
Dept: SLEEP MEDICINE | Facility: HOSPITAL | Age: 39
Discharge: HOME | End: 2019-11-24
Admitting: INTERNAL MEDICINE

## 2019-11-24 VITALS
TEMPERATURE: 98.4 F | DIASTOLIC BLOOD PRESSURE: 103 MMHG | WEIGHT: 238.1 LBS | HEIGHT: 70 IN | BODY MASS INDEX: 34.09 KG/M2 | OXYGEN SATURATION: 93 % | SYSTOLIC BLOOD PRESSURE: 150 MMHG | HEART RATE: 84 BPM

## 2019-11-24 DIAGNOSIS — G47.33 OBSTRUCTIVE SLEEP APNEA, ADULT: ICD-10-CM

## 2019-11-24 PROCEDURE — 95811 POLYSOM 6/>YRS CPAP 4/> PARM: CPT | Performed by: INTERNAL MEDICINE

## 2019-11-24 PROCEDURE — 95811 POLYSOM 6/>YRS CPAP 4/> PARM: CPT

## 2019-12-05 DIAGNOSIS — G47.33 OBSTRUCTIVE SLEEP APNEA, ADULT: Primary | ICD-10-CM

## 2019-12-10 ENCOUNTER — OFFICE VISIT (OUTPATIENT)
Dept: SLEEP MEDICINE | Facility: HOSPITAL | Age: 39
End: 2019-12-10

## 2019-12-10 VITALS
HEIGHT: 70 IN | BODY MASS INDEX: 33.58 KG/M2 | SYSTOLIC BLOOD PRESSURE: 159 MMHG | HEART RATE: 80 BPM | OXYGEN SATURATION: 98 % | WEIGHT: 234.6 LBS | DIASTOLIC BLOOD PRESSURE: 96 MMHG

## 2019-12-10 DIAGNOSIS — D75.1 POLYCYTHEMIA: ICD-10-CM

## 2019-12-10 DIAGNOSIS — E29.1 TESTOSTERONE DEFICIENCY IN MALE: ICD-10-CM

## 2019-12-10 DIAGNOSIS — G47.33 OBSTRUCTIVE SLEEP APNEA, ADULT: Primary | ICD-10-CM

## 2019-12-10 PROCEDURE — 99214 OFFICE O/P EST MOD 30 MIN: CPT | Performed by: INTERNAL MEDICINE

## 2019-12-10 NOTE — PROGRESS NOTES
"CHIEF COMPLAINT: follow up sleep testing    LAST OV: 11/12/2019    HPI:    The patient is a 39 y.o. male.  He returns to sleep clinic to discuss the results of the recent split night polysomnography performed on 11/24/219.  The AHI/DARISUZ was 25.0, REM AHI 61.3. The patient had a periodic limb movement index of 0.  The patient did not have any significant cardiac arrhythmias. The patient qualifed for split night titration. He was titrated on CPAP pressures of 5-10 cm H2O with a Nasal mask. On the best setting, 10 cmH2O , the patient slept for 77.9 minutes, with 16.0 minutes of REM sleep. The residual AHI was 0 and the oxygen anoop was 92%.    INTERVAL MEDICAL HISTORY: No change since last office visit in regard to the patient's bedtime routine, medications, or diagnosis.      MEDICATIONS:   Current Outpatient Medications:   •  fluticasone (FLONASE) 50 MCG/ACT nasal spray, 1 spray into each nostril., Disp: , Rfl:   •  Levocetirizine Dihydrochloride (XYZAL PO), Take  by mouth., Disp: , Rfl:   •  Needle, Disp, (BD HYPODERMIC NEEDLE) 18G X 1\" misc, Use to draw up testosterone, Disp: 25 each, Rfl: 1  •  Needle, Disp, (BD SAFETYGLIDE SHIELDED NEEDLE) 25G X 1\" misc, Use up to 4 times a month, Disp: 50 each, Rfl: 1  •  Syringe/Needle, Disp, (B-D SYRINGE/NEEDLE 3CC/23GX1\") 23G X 1\" 3 ML misc, Use twice monthly with testosterone injections, Disp: 25 each, Rfl: 1  •  Testosterone Cypionate (DEPO-TESTOSTERONE) 200 MG/ML injection, Inject 1 mL into the appropriate muscle as directed by prescriber Every 14 (Fourteen) Days., Disp: 10 mL, Rfl: 5    Current Facility-Administered Medications:   •  Testosterone Cypionate (DEPOTESTOTERONE CYPIONATE) injection 200 mg, 200 mg, Intramuscular, Q14 Days, Chris Benitez MD, 200 mg at 06/24/19 1028    PHYSICAL EXAM  Vital Signs (last 24 hours)     None            12/10/19  0844   Weight: 106 kg (234 lb 9.6 oz)     Gen:  No acute distress, alert, oriented  Lungs:  CTA with noirmal effort "   CV:  RRR, no M/R/G  GI:  soft, non-tender  Ext:  no c/c/e    Assessment and Plan:    1. Obstructive sleep apnea stable chronic illness and New (to me), additional work-up planned (4)   1. Script for autocpap 10-20 cm H2O   2. RTC in 31-90 days with compliance check  2. HTN - normal today.  3. Polycythemia - stable chronic illness and New (to me), additional work-up planned (3)   1. Referral to hematology, may be secondary to testosterone use, may not be  4. Hypogonadism - stable chronic illness and New (to me), additional work-up planned (4)   1. Ref to endocrinology    The sleep results were discussed in detail with the patient.  The risks of untreated sleep apnea were reviewed.  Treatment options for sleep apnea were discussed. I counseled patient on sleep hygiene, including regular sleep wake schedule and stimulus control therapy, the importance of weight reduction, and abstaining from smoking and alcohol consumption.    All of the patient's questions were answered. He states understanding and agreement with my assessment and plan as above.  Total visit time 15 min, with total of 10 minutes spent face-to-face counseling patient extensively regarding: Lifestyle modifications and Medication changes and referrals      Patient to follow up in 31-90 days with compliance report   He agrees to return sooner on a prn basis if sx change.           This document has been electronically signed by Donavon Prieto MD on December 10, 2019           CC: Chris Benitez MD          No ref. provider found

## 2020-03-10 ENCOUNTER — OFFICE VISIT (OUTPATIENT)
Dept: SLEEP MEDICINE | Facility: HOSPITAL | Age: 40
End: 2020-03-10

## 2020-03-10 VITALS
SYSTOLIC BLOOD PRESSURE: 136 MMHG | WEIGHT: 238 LBS | HEIGHT: 70 IN | BODY MASS INDEX: 34.07 KG/M2 | HEART RATE: 78 BPM | DIASTOLIC BLOOD PRESSURE: 92 MMHG | OXYGEN SATURATION: 98 %

## 2020-03-10 DIAGNOSIS — G47.33 OBSTRUCTIVE SLEEP APNEA, ADULT: Primary | ICD-10-CM

## 2020-03-10 PROCEDURE — 99213 OFFICE O/P EST LOW 20 MIN: CPT | Performed by: INTERNAL MEDICINE

## 2020-03-10 NOTE — PROGRESS NOTES
Sleep Clinic Follow Up    Date: 3/10/2020  Primary Care Physician: Chris Benitez MD    Last office visit: 12/10/20 (I reviewed the note from this day for accuracy and anything.  Any portions or data from that note that have been used in this note have been checked for accuracy and the appropriate changes have been made to this note were ever necessary.)    CC: Follow up: follow up BUCK        Assessment and Plan:    1. Obstructive sleep apnea - stable chronic illness  1. Compliant and improved with PAP therapy  2. Continue PAP as prescribed.   3. Script for PAP supplies  2. HTN: stable, chronic problem  3. Hypogonadism  stable chronic illness;   1. Depot-testosterone injections per endocrine  4. Polycythemia  stable chronic illness   1. Has yet to make appointment with hematologist  8. Caffeine excess stable chronic illness  1. Recommend reducing caffeine intake over time to no more than 400 mg of caffeine per day, no later than 1 hour after lunch.  Interim History:  Since the last visit:    1) moderate BUCK -  Zheng Viera has remained compliant with CPAP. He denies mask and machine issues,  headaches, or pressures intolerance. He denies abnormal dreams, sleep paralysis, nasal congestion, URI sx.  Patient states he is doing much better with the CPAP. Is not snoring any more. Is complaining of dry mouth with use occasionally.     Bed time: [id number]  Sleep latency: 5-15 mins minutes  Number of times awakens during the night: 1, at times 3-4  Wake time: 6-615  Estimated total sleep time at night: 5-7 hours  Caffeine intake: 20-30 oz of coffee, 0 oz of tea, and 12-24 oz of soda  Alcohol intake: social  Nap time: no   Sleepiness with Driving: no    [place] - 8    PMHx, FH, SH reviewed and pertinent changes are:  unchanged from last office visit (date above)      REVIEW OF SYSTEMS:   Negative for chest pain, fever, cough, SOA, abdominal pain. Smoking:none    Zheng Viera  reports that he has never smoked.  "He has never used smokeless tobacco.       Exam:  Vitals:    03/10/20 0855   BP: 136/92   Pulse: 78   SpO2: 98%           03/10/20  0855   Weight: 108 kg (238 lb)     Body mass index is 34.34 kg/m². Patient's Body mass index is 34.34 kg/m². BMI is above normal parameters. Recommendations include: nutrition counseling.    Gen:  No acute distress, alert, oriented  Lungs:  CTA with good effort   CV:  RRR, no M/R/G  GI:  soft, non-tender  Psych:  Appropriate affect    Past Medical History:   Diagnosis Date   • Class 1 obesity due to excess calories without serious comorbidity with body mass index (BMI) of 34.0 to 34.9 in adult 4/24/2018   • Grade I hemorrhoids 4/24/2018   • Hypertriglyceridemia - mild 4/24/2018   • Male hypogonadism 5/18/2018       Current Outpatient Medications:   •  fluticasone (FLONASE) 50 MCG/ACT nasal spray, 1 spray into each nostril., Disp: , Rfl:   •  Levocetirizine Dihydrochloride (XYZAL PO), Take  by mouth., Disp: , Rfl:   •  Needle, Disp, (BD HYPODERMIC NEEDLE) 18G X 1\" misc, Use to draw up testosterone, Disp: 25 each, Rfl: 1  •  Needle, Disp, (BD SAFETYGLIDE SHIELDED NEEDLE) 25G X 1\" misc, Use up to 4 times a month, Disp: 50 each, Rfl: 1  •  Syringe/Needle, Disp, (B-D SYRINGE/NEEDLE 3CC/23GX1\") 23G X 1\" 3 ML misc, Use twice monthly with testosterone injections, Disp: 25 each, Rfl: 1  •  Testosterone Cypionate (DEPO-TESTOSTERONE) 200 MG/ML injection, Inject 1 mL into the appropriate muscle as directed by prescriber Every 14 (Fourteen) Days., Disp: 10 mL, Rfl: 5    Current Facility-Administered Medications:   •  Testosterone Cypionate (DEPOTESTOTERONE CYPIONATE) injection 200 mg, 200 mg, Intramuscular, Q14 Days, Chris Benitez MD, 200 mg at 06/24/19 1028    Total visit time 20 min, with total of 10 minutes spent face-to-face counseling patient extensively regarding:   PAP therapy and trial of FFM to help with occasional dry mouth      RTC in 12 months     This document has been electronically " signed by Aaron Marks MD on March 10, 2020         CC: Chris Benitez MD          No ref. provider found     I reviewed the history, notes, and assesments with the resident in front of the patient.  I independently examined the patient and corroborated the history as recorded. I agree with the signed findings and have made changes to the documentation where necessary.        This document has been electronically signed by Donavon Prieto MD on March 10, 2020 09:34

## 2020-03-11 ENCOUNTER — OFFICE VISIT (OUTPATIENT)
Dept: ENDOCRINOLOGY | Facility: CLINIC | Age: 40
End: 2020-03-11

## 2020-03-11 VITALS
HEIGHT: 70 IN | DIASTOLIC BLOOD PRESSURE: 80 MMHG | WEIGHT: 237.2 LBS | SYSTOLIC BLOOD PRESSURE: 130 MMHG | BODY MASS INDEX: 33.96 KG/M2 | OXYGEN SATURATION: 99 % | HEART RATE: 80 BPM

## 2020-03-11 DIAGNOSIS — F40.231 FEAR OF INJECTIONS: ICD-10-CM

## 2020-03-11 DIAGNOSIS — R94.6 ABNORMAL THYROID FUNCTION TEST: ICD-10-CM

## 2020-03-11 DIAGNOSIS — D75.1 POLYCYTHEMIA: ICD-10-CM

## 2020-03-11 DIAGNOSIS — E29.1 HYPOGONADISM IN MALE: Primary | ICD-10-CM

## 2020-03-11 DIAGNOSIS — N40.0 PROSTATISM: ICD-10-CM

## 2020-03-11 PROCEDURE — 99204 OFFICE O/P NEW MOD 45 MIN: CPT | Performed by: INTERNAL MEDICINE

## 2020-03-11 RX ORDER — CONTAINER,EMPTY
1 EACH MISCELLANEOUS AS NEEDED
Qty: 1 EACH | Refills: 1 | Status: SHIPPED | OUTPATIENT
Start: 2020-03-11 | End: 2021-03-11

## 2020-03-11 RX ORDER — NAPROXEN SODIUM 220 MG
TABLET ORAL
Qty: 50 EACH | Refills: 11 | Status: SHIPPED | OUTPATIENT
Start: 2020-03-11 | End: 2020-07-22 | Stop reason: SDUPTHER

## 2020-03-11 RX ORDER — TESTOSTERONE CYPIONATE 200 MG/ML
INJECTION, SOLUTION INTRAMUSCULAR
Qty: 10 ML | Refills: 5 | Status: SHIPPED | OUTPATIENT
Start: 2020-03-11 | End: 2020-07-22 | Stop reason: SDUPTHER

## 2020-03-11 NOTE — PROGRESS NOTES
" Zheng Viera is a 39 y.o. male who presents for  evaluation of   Chief Complaint   Patient presents with   • low testosterone levels       Referring provider    Donavon Prieto MD  [address] [place] [address]    Primary Care Provider    Chris Benitez MD      40 yo male    Male Hypogonadism    Duration , dating back to 2018    Timing , constant    Quality , partial control    Modifying Factor, doing testosterone intramuscular    Complications/severity, patient has developed polycythemia and obstructive sleep apnea.    Additional symptoms, fatigue/weakness and libido have improved    Past Medical History:   Diagnosis Date   • Class 1 obesity due to excess calories without serious comorbidity with body mass index (BMI) of 34.0 to 34.9 in adult 4/24/2018   • Grade I hemorrhoids 4/24/2018   • Hypertriglyceridemia - mild 4/24/2018   • Male hypogonadism 5/18/2018     No family history on file.  Social History     Tobacco Use   • Smoking status: Never Smoker   • Smokeless tobacco: Never Used   Substance Use Topics   • Alcohol use: No     Comment: social drinker   • Drug use: No         Current Outpatient Medications:   •  fluticasone (FLONASE) 50 MCG/ACT nasal spray, 1 spray into each nostril., Disp: , Rfl:   •  Levocetirizine Dihydrochloride (XYZAL PO), Take  by mouth., Disp: , Rfl:   •  Needle, Disp, (BD HYPODERMIC NEEDLE) 18G X 1\" misc, Use to draw up testosterone, Disp: 25 each, Rfl: 1  •  Needle, Disp, (BD SAFETYGLIDE SHIELDED NEEDLE) 25G X 1\" misc, Use up to 4 times a month, Disp: 50 each, Rfl: 1  •  Syringe/Needle, Disp, (B-D SYRINGE/NEEDLE 3CC/23GX1\") 23G X 1\" 3 ML misc, Use twice monthly with testosterone injections, Disp: 25 each, Rfl: 1  •  Testosterone Cypionate (DEPO-TESTOSTERONE) 200 MG/ML injection, Inject 1 mL into the appropriate muscle as directed by prescriber Every 14 (Fourteen) Days., Disp: 10 mL, Rfl: 5  •  Insulin Syringe 31G X 5/16\" 0.5 ML misc, Use as indicated, Disp: 50 " "each, Rfl: 11  •  Needle, Disp, (BD DISP NEEDLE) 23G X 1\" misc, Use twcie weekly, Disp: 8 each, Rfl: 11  •  Needle, Disp, 18G X 1\" misc, Use twice weekly as indicated, Disp: 8 each, Rfl: 11  •  sharps container, 1 each As Needed (discard needles)., Disp: 1 each, Rfl: 1  •  Testosterone Cypionate (DEPOTESTOTERONE CYPIONATE) 200 MG/ML injection, 50 mg subcutaneously/IM twice weekly, provide #8 (21G,18G,3cc syringes) per month, Disp: 10 mL, Rfl: 5  •  [START ON 3/12/2020] Testosterone Enanthate (XYOSTED) 50 MG/0.5ML solution auto-injector, Inject 50 mg under the skin into the appropriate area as directed 2 (Two) Times a Week., Disp: 8 pen, Rfl: 5    Current Facility-Administered Medications:   •  Testosterone Cypionate (DEPOTESTOTERONE CYPIONATE) injection 200 mg, 200 mg, Intramuscular, Q14 Days, Chris Benitez MD, 200 mg at 06/24/19 1028    Review of Systems    Review of Systems   Constitutional: Negative for activity change, appetite change, chills, diaphoresis, fatigue, fever and unexpected weight change.   HENT: Negative for congestion, dental problem, drooling, ear discharge, ear pain, facial swelling, mouth sores, postnasal drip, rhinorrhea, sinus pressure, sore throat, tinnitus, trouble swallowing and voice change.    Eyes: Negative for photophobia, pain, discharge, redness, itching and visual disturbance.   Respiratory: Negative for apnea, cough, choking, chest tightness, shortness of breath, wheezing and stridor.    Cardiovascular: Negative for chest pain, palpitations and leg swelling.   Gastrointestinal: Negative for abdominal distention, abdominal pain, constipation, diarrhea, nausea and vomiting.   Endocrine: Negative for cold intolerance, heat intolerance, polydipsia, polyphagia and polyuria.   Genitourinary: Negative for decreased urine volume, difficulty urinating, dysuria, flank pain, frequency, hematuria and urgency.   Musculoskeletal: Negative for arthralgias, back pain, gait problem, joint swelling, " "myalgias, neck pain and neck stiffness.   Skin: Negative for color change, pallor, rash and wound.   Allergic/Immunologic: Negative for immunocompromised state.   Neurological: Negative for dizziness, tremors, seizures, syncope, facial asymmetry, speech difficulty, weakness, light-headedness, numbness and headaches.   Hematological: Negative for adenopathy.   Psychiatric/Behavioral: Negative for agitation, behavioral problems, confusion, decreased concentration, dysphoric mood, hallucinations, self-injury, sleep disturbance and suicidal ideas. The patient is not nervous/anxious and is not hyperactive.         Objective:   /80   Pulse 80   Ht 177.3 cm (69.8\")   Wt 108 kg (237 lb 3.2 oz)   SpO2 99%   BMI 34.23 kg/m²     Physical Exam   Constitutional: He is oriented to person, place, and time. He appears well-developed and well-nourished. He is cooperative.   HENT:   Head: Normocephalic and atraumatic.   Right Ear: External ear normal.   Left Ear: External ear normal.   Nose: Nose normal.   Mouth/Throat: Oropharynx is clear and moist. No oropharyngeal exudate.   Eyes: Pupils are equal, round, and reactive to light. Conjunctivae and EOM are normal. No scleral icterus. Right eye exhibits normal extraocular motion. Left eye exhibits normal extraocular motion.   Neck: Neck supple. No JVD present. No muscular tenderness present. No tracheal deviation, no edema and no erythema present. No thyromegaly present.   Cardiovascular: Normal rate, regular rhythm, normal heart sounds and intact distal pulses. Exam reveals no gallop and no friction rub.   No murmur heard.  Pulmonary/Chest: Effort normal and breath sounds normal. No stridor. No respiratory distress. He has no decreased breath sounds. He has no wheezes. He has no rhonchi. He has no rales. He exhibits no tenderness.   Abdominal: Soft. Bowel sounds are normal. He exhibits no distension and no mass. There is no hepatomegaly. There is no tenderness. There is no " rebound and no guarding. No hernia.   Musculoskeletal: Normal range of motion. He exhibits no edema, tenderness or deformity.   Lymphadenopathy:     He has no cervical adenopathy.   Neurological: He is alert and oriented to person, place, and time. He has normal reflexes. No cranial nerve deficit. He exhibits normal muscle tone. Coordination normal.   Skin: Skin is warm. No rash noted. No erythema. No pallor.   Psychiatric: He has a normal mood and affect. His behavior is normal. Judgment and thought content normal.   Nursing note and vitals reviewed.      Lab Review    Results for orders placed or performed in visit on 10/29/19   Comprehensive Metabolic Panel   Result Value Ref Range    Glucose 87 70 - 99 mg/dL    BUN 16 7 - 23 mg/dL    Creatinine 1.03 0.70 - 1.30 mg/dL    Sodium 142 137 - 145 mmol/L    Potassium 4.6 3.4 - 5.0 mmol/L    Chloride 102 101 - 112 mmol/L    CO2 29.0 22.0 - 30.0 mmol/L    Calcium 9.0 8.4 - 10.2 mg/dL    Total Protein 7.5 6.3 - 8.6 g/dL    Albumin 4.70 3.50 - 5.00 g/dL    ALT (SGPT) 59 (H) <=50 U/L    AST (SGOT) 39 17 - 59 U/L    Alkaline Phosphatase 64 38 - 126 U/L    Total Bilirubin 1.2 0.2 - 1.3 mg/dL    eGFR Non  Amer 81 70 - 162 mL/min/1.73    Globulin 2.8 2.3 - 3.5 gm/dL    A/G Ratio 1.7 1.1 - 1.8 g/dL    BUN/Creatinine Ratio 15.5 7.0 - 25.0    Anion Gap 11.0 5.0 - 15.0 mmol/L   CBC Auto Differential   Result Value Ref Range    WBC 8.67 3.40 - 10.80 10*3/mm3    RBC 5.48 4.14 - 5.80 10*6/mm3    Hemoglobin 17.5 13.0 - 17.7 g/dL    Hematocrit 51.1 (H) 37.5 - 51.0 %    MCV 93.2 79.0 - 97.0 fL    MCH 31.9 26.6 - 33.0 pg    MCHC 34.2 31.5 - 35.7 g/dL    RDW 13.6 12.3 - 15.4 %    RDW-SD 45.6 37.0 - 54.0 fl    MPV 10.4 6.0 - 12.0 fL    Platelets 279 140 - 450 10*3/mm3    Neutrophil % 73.5 42.7 - 76.0 %    Lymphocyte % 15.1 (L) 19.6 - 45.3 %    Monocyte % 8.5 5.0 - 12.0 %    Eosinophil % 2.0 0.3 - 6.2 %    Basophil % 0.9 0.0 - 1.5 %    Neutrophils, Absolute 6.37 1.70 - 7.00 10*3/mm3     Lymphocytes, Absolute 1.31 0.70 - 3.10 10*3/mm3    Monocytes, Absolute 0.74 0.10 - 0.90 10*3/mm3    Eosinophils, Absolute 0.17 0.00 - 0.40 10*3/mm3    Basophils, Absolute 0.08 0.00 - 0.20 10*3/mm3   Testosterone, Free, Total   Result Value Ref Range    Testosterone, Total 682 264 - 916 ng/dL    Testosterone, Free 17.7 8.7 - 25.1 pg/mL         Assessment/Plan       ICD-10-CM ICD-9-CM   1. Hypogonadism in male E29.1 257.2   2. Polycythemia D75.1 238.4   3. Prostatism N40.0 600.90   4. Abnormal thyroid function test R94.6 794.5   5. 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   From review of records :    Patient has male hypogonadism dating back to 2018.    Presently doing testosterone intramuscular 100 mg weekly.    We discussed about the ongoing dilemma of estrogen blockade.    Rather than blocking estrogen we will do micro-dosing, 50 mg subcutaneously of testosterone cypionate twice weekly or of Xyosted if insurance approves.    I want to use xyosted  given that patient has extreme fear of injections.    We will evaluate estradiol as well as testosterone 2 days post 50 mg subcu testosterone.    For completeness we will obtain a prolactin and thyroid function tests as well as growth hormone   Obstructive sleep apnea, patient is already seeing Dr. Prieto and is compliant with CPAP    I reassured him that he does not need to see hematologist at this time, we will see if polycythemia resolves by doing micro-dosing of testosterone and treatment of sleep apnea      Orders Placed This Encounter   Procedures   • Testosterone   • Estradiol   • Iron Profile   • Prolactin   • Hematocrit   • Comprehensive Metabolic Panel   • PSA Total, Reflex To Free   • TSH   • Insulin-like Growth Factor         A copy of my note was sent to Chris Benitez MD    Please see my above opinion and suggestions.             This document has  "been electronically signed by Harpal Wheeler MD on 2020 10:24        Patient Demographics     Patient Name  Zheng Viera Sex  Male   1980 N   Address  [address] [place] [id number] Phone  [phone] (Home)  [phone] (Work)  [phone] (Mobile)   Emergency Contact(s)     Name Relation Home Work Mobile   Ev Viera Spouse [phone]   PCP and Center     Primary Care Provider Phone Center   Chris Benitez [phone] None   Patient Employment     Status Employer Address   Full Time [employer] [address], [place] [address]   Active Insurance as of 3/23/2020     [employer]HE[employer] BLUE CROSS - ANTHEM BLUE CROSS BLUE SHIELD PPO     Payor Plan Insurance Group Employer/Plan Group   [employer] BLUE CROSS [employer] BLUE CROSS BLUE SHIELD PPO [id number]    Payor Plan Address Payor Plan Phone Number Payor Plan Fax Number Effective Dates   [address] [phone]  2018 - None Entered   [facility] [id number]      Subscriber Name Subscriber Birth Date Member ID    ZHENG VIERA 1980 [id number]    Current Medications     fluticasone (FLONASE) 50 MCG/ACT nasal spray   Insulin Syringe 31G X 5/16\" 0.5 ML misc   Levocetirizine Dihydrochloride (XYZAL PO)   Needle, Disp, (BD DISP NEEDLE) 23G X 1\" misc   Needle, Disp, (BD HYPODERMIC NEEDLE) 18G X 1\" misc   Needle, Disp, (BD SAFETYGLIDE SHIELDED NEEDLE) 25G X 1\" misc   Needle, Disp, 18G X 1\" misc   sharps container   Syringe/Needle, Disp, (B-D SYRINGE/NEEDLE 3CC/23GX1\") 23G X 1\" 3 ML misc   Testosterone Cypionate (DEPO-TESTOSTERONE) 200 MG/ML injection   Testosterone Cypionate (DEPOTESTOTERONE CYPIONATE) 200 MG/ML injection   Testosterone Enanthate (XYOSTED) 50 MG/0.5ML solution auto-injector   Clinic-Administered Medications     Testosterone Cypionate (DEPOTESTOTERONE CYPIONATE) injection 200 mg (Discontinued)   Allergies     Penicillins Unknown (See Comments)   Future " Appointments      Provider Department Center   5/11/2020 8:00 AM Chris Benitez MD [facility]    3/10/2021 9:00 AM Shelli Jarrell APRN [facility]

## 2020-06-03 ENCOUNTER — LAB (OUTPATIENT)
Dept: LAB | Facility: OTHER | Age: 40
End: 2020-06-03

## 2020-06-03 DIAGNOSIS — E29.1 MALE HYPOGONADISM: Chronic | ICD-10-CM

## 2020-06-03 DIAGNOSIS — R53.83 FATIGUE, UNSPECIFIED TYPE: ICD-10-CM

## 2020-06-03 DIAGNOSIS — E78.1 HYPERTRIGLYCERIDEMIA: Chronic | ICD-10-CM

## 2020-06-03 LAB
ALBUMIN SERPL-MCNC: 4.2 G/DL (ref 3.5–5)
ALBUMIN/GLOB SERPL: 1.6 G/DL (ref 1.1–1.8)
ALP SERPL-CCNC: 59 U/L (ref 38–126)
ALT SERPL W P-5'-P-CCNC: 45 U/L
ANION GAP SERPL CALCULATED.3IONS-SCNC: 5 MMOL/L (ref 5–15)
AST SERPL-CCNC: 36 U/L (ref 17–59)
BASOPHILS # BLD AUTO: 0.06 10*3/MM3 (ref 0–0.2)
BASOPHILS NFR BLD AUTO: 0.8 % (ref 0–1.5)
BILIRUB SERPL-MCNC: 1.1 MG/DL (ref 0.2–1.3)
BUN BLD-MCNC: 16 MG/DL (ref 7–23)
BUN/CREAT SERPL: 14.2 (ref 7–25)
CALCIUM SPEC-SCNC: 9 MG/DL (ref 8.4–10.2)
CHLORIDE SERPL-SCNC: 103 MMOL/L (ref 101–112)
CHOLEST SERPL-MCNC: 162 MG/DL (ref 150–200)
CO2 SERPL-SCNC: 28 MMOL/L (ref 22–30)
CREAT BLD-MCNC: 1.13 MG/DL (ref 0.7–1.3)
DEPRECATED RDW RBC AUTO: 43.4 FL (ref 37–54)
EOSINOPHIL # BLD AUTO: 0.14 10*3/MM3 (ref 0–0.4)
EOSINOPHIL NFR BLD AUTO: 1.8 % (ref 0.3–6.2)
ERYTHROCYTE [DISTWIDTH] IN BLOOD BY AUTOMATED COUNT: 12.9 % (ref 12.3–15.4)
GFR SERPL CREATININE-BSD FRML MDRD: 73 ML/MIN/1.73 (ref 70–162)
GLOBULIN UR ELPH-MCNC: 2.6 GM/DL (ref 2.3–3.5)
GLUCOSE BLD-MCNC: 96 MG/DL (ref 70–99)
HCT VFR BLD AUTO: 47.8 % (ref 37.5–51)
HDLC SERPL-MCNC: 41 MG/DL (ref 40–59)
HGB BLD-MCNC: 16.4 G/DL (ref 13–17.7)
LDLC SERPL CALC-MCNC: 104 MG/DL
LDLC/HDLC SERPL: 2.54 {RATIO} (ref 0–3.55)
LYMPHOCYTES # BLD AUTO: 1.17 10*3/MM3 (ref 0.7–3.1)
LYMPHOCYTES NFR BLD AUTO: 15.3 % (ref 19.6–45.3)
MCH RBC QN AUTO: 32.3 PG (ref 26.6–33)
MCHC RBC AUTO-ENTMCNC: 34.3 G/DL (ref 31.5–35.7)
MCV RBC AUTO: 94.1 FL (ref 79–97)
MONOCYTES # BLD AUTO: 0.7 10*3/MM3 (ref 0.1–0.9)
MONOCYTES NFR BLD AUTO: 9.2 % (ref 5–12)
NEUTROPHILS # BLD AUTO: 5.56 10*3/MM3 (ref 1.7–7)
NEUTROPHILS NFR BLD AUTO: 72.9 % (ref 42.7–76)
PLATELET # BLD AUTO: 300 10*3/MM3 (ref 140–450)
PMV BLD AUTO: 10.4 FL (ref 6–12)
POTASSIUM BLD-SCNC: 4.3 MMOL/L (ref 3.4–5)
PROT SERPL-MCNC: 6.8 G/DL (ref 6.3–8.6)
RBC # BLD AUTO: 5.08 10*6/MM3 (ref 4.14–5.8)
SODIUM BLD-SCNC: 136 MMOL/L (ref 137–145)
TRIGL SERPL-MCNC: 85 MG/DL
VLDLC SERPL-MCNC: 17 MG/DL
WBC NRBC COR # BLD: 7.63 10*3/MM3 (ref 3.4–10.8)

## 2020-06-03 PROCEDURE — 80053 COMPREHEN METABOLIC PANEL: CPT | Performed by: INTERNAL MEDICINE

## 2020-06-03 PROCEDURE — 85025 COMPLETE CBC W/AUTO DIFF WBC: CPT | Performed by: INTERNAL MEDICINE

## 2020-06-03 PROCEDURE — 84305 ASSAY OF SOMATOMEDIN: CPT | Performed by: INTERNAL MEDICINE

## 2020-06-03 PROCEDURE — 82607 VITAMIN B-12: CPT | Performed by: INTERNAL MEDICINE

## 2020-06-03 PROCEDURE — 84146 ASSAY OF PROLACTIN: CPT | Performed by: INTERNAL MEDICINE

## 2020-06-03 PROCEDURE — 83540 ASSAY OF IRON: CPT | Performed by: INTERNAL MEDICINE

## 2020-06-03 PROCEDURE — 84402 ASSAY OF FREE TESTOSTERONE: CPT | Performed by: INTERNAL MEDICINE

## 2020-06-03 PROCEDURE — 84466 ASSAY OF TRANSFERRIN: CPT | Performed by: INTERNAL MEDICINE

## 2020-06-03 PROCEDURE — 84443 ASSAY THYROID STIM HORMONE: CPT | Performed by: INTERNAL MEDICINE

## 2020-06-03 PROCEDURE — 82670 ASSAY OF TOTAL ESTRADIOL: CPT | Performed by: INTERNAL MEDICINE

## 2020-06-03 PROCEDURE — 84403 ASSAY OF TOTAL TESTOSTERONE: CPT | Performed by: INTERNAL MEDICINE

## 2020-06-03 PROCEDURE — 36415 COLL VENOUS BLD VENIPUNCTURE: CPT | Performed by: INTERNAL MEDICINE

## 2020-06-03 PROCEDURE — 80061 LIPID PANEL: CPT | Performed by: INTERNAL MEDICINE

## 2020-06-03 PROCEDURE — 84153 ASSAY OF PSA TOTAL: CPT | Performed by: INTERNAL MEDICINE

## 2020-06-04 LAB
ESTRADIOL SERPL HS-MCNC: 22.3 PG/ML
IRON 24H UR-MRATE: 106 MCG/DL (ref 59–158)
IRON SATN MFR SERPL: 24 % (ref 20–50)
PROLACTIN SERPL-MCNC: 9.01 NG/ML (ref 4.04–15.2)
PSA SERPL-MCNC: 0.9 NG/ML (ref 0–4)
REFLEX: NORMAL
TESTOST SERPL-MCNC: 274 NG/DL (ref 249–836)
TIBC SERPL-MCNC: 441 MCG/DL (ref 298–536)
TRANSFERRIN SERPL-MCNC: 296 MG/DL (ref 200–360)
TSH SERPL DL<=0.05 MIU/L-ACNC: 2.97 UIU/ML (ref 0.27–4.2)
VIT B12 BLD-MCNC: 823 PG/ML (ref 211–946)

## 2020-06-05 LAB — IGF-I SERPL-MCNC: 152 NG/ML (ref 83–233)

## 2020-06-06 LAB
TESTOST FREE SERPL-MCNC: 7.4 PG/ML (ref 8.7–25.1)
TESTOST SERPL-MCNC: 290 NG/DL (ref 264–916)

## 2020-06-09 ENCOUNTER — TELEMEDICINE (OUTPATIENT)
Dept: FAMILY MEDICINE CLINIC | Facility: CLINIC | Age: 40
End: 2020-06-09

## 2020-06-09 VITALS — WEIGHT: 225 LBS | BODY MASS INDEX: 32.21 KG/M2 | HEIGHT: 70 IN

## 2020-06-09 DIAGNOSIS — D75.1 POLYCYTHEMIA: Chronic | ICD-10-CM

## 2020-06-09 DIAGNOSIS — G47.33 OBSTRUCTIVE SLEEP APNEA SYNDROME: Chronic | ICD-10-CM

## 2020-06-09 DIAGNOSIS — E29.1 MALE HYPOGONADISM: Chronic | ICD-10-CM

## 2020-06-09 DIAGNOSIS — E66.09 CLASS 1 OBESITY DUE TO EXCESS CALORIES WITHOUT SERIOUS COMORBIDITY WITH BODY MASS INDEX (BMI) OF 32.0 TO 32.9 IN ADULT: Chronic | ICD-10-CM

## 2020-06-09 DIAGNOSIS — R79.89 ELEVATED LFTS: ICD-10-CM

## 2020-06-09 DIAGNOSIS — K21.9 GASTROESOPHAGEAL REFLUX DISEASE WITHOUT ESOPHAGITIS: Chronic | ICD-10-CM

## 2020-06-09 DIAGNOSIS — E78.1 HYPERTRIGLYCERIDEMIA: Primary | Chronic | ICD-10-CM

## 2020-06-09 PROCEDURE — 99214 OFFICE O/P EST MOD 30 MIN: CPT | Performed by: INTERNAL MEDICINE

## 2020-06-09 RX ORDER — SYRINGE WITH NEEDLE, 1 ML 25GX5/8"
SYRINGE, EMPTY DISPOSABLE MISCELLANEOUS
COMMUNITY
Start: 2020-03-13 | End: 2020-06-09 | Stop reason: SDUPTHER

## 2020-06-09 RX ORDER — CHOLECALCIFEROL (VITAMIN D3) 125 MCG
500 CAPSULE ORAL DAILY
COMMUNITY

## 2020-06-09 RX ORDER — SYRINGE WITH NEEDLE, 1 ML 25GX5/8"
SYRINGE, EMPTY DISPOSABLE MISCELLANEOUS
Qty: 25 EACH | Refills: 5 | Status: SHIPPED | OUTPATIENT
Start: 2020-06-09 | End: 2020-09-11 | Stop reason: SDUPTHER

## 2020-06-09 NOTE — PATIENT INSTRUCTIONS
Calorie Counting for Weight Loss  Calories are units of energy. Your body needs a certain amount of calories from food to keep you going throughout the day. When you eat more calories than your body needs, your body stores the extra calories as fat. When you eat fewer calories than your body needs, your body burns fat to get the energy it needs.  Calorie counting means keeping track of how many calories you eat and drink each day. Calorie counting can be helpful if you need to lose weight. If you make sure to eat fewer calories than your body needs, you should lose weight. Ask your health care provider what a healthy weight is for you.  For calorie counting to work, you will need to eat the right number of calories in a day in order to lose a healthy amount of weight per week. A dietitian can help you determine how many calories you need in a day and will give you suggestions on how to reach your calorie goal.  · A healthy amount of weight to lose per week is usually 1-2 lb (0.5-0.9 kg). This usually means that your daily calorie intake should be reduced by 500-750 calories.  · Eating 1,200 - 1,500 calories per day can help most women lose weight.  · Eating 1,500 - 1,800 calories per day can help most men lose weight.  What is my plan?  My goal is to have __________ calories per day.  If I have this many calories per day, I should lose around __________ pounds per week.  What do I need to know about calorie counting?  In order to meet your daily calorie goal, you will need to:  · Find out how many calories are in each food you would like to eat. Try to do this before you eat.  · Decide how much of the food you plan to eat.  · Write down what you ate and how many calories it had. Doing this is called keeping a food log.  To successfully lose weight, it is important to balance calorie counting with a healthy lifestyle that includes regular activity. Aim for 150 minutes of moderate exercise (such as walking) or 75  minutes of vigorous exercise (such as running) each week.  Where do I find calorie information?    The number of calories in a food can be found on a Nutrition Facts label. If a food does not have a Nutrition Facts label, try to look up the calories online or ask your dietitian for help.  Remember that calories are listed per serving. If you choose to have more than one serving of a food, you will have to multiply the calories per serving by the amount of servings you plan to eat. For example, the label on a package of bread might say that a serving size is 1 slice and that there are 90 calories in a serving. If you eat 1 slice, you will have eaten 90 calories. If you eat 2 slices, you will have eaten 180 calories.  How do I keep a food log?  Immediately after each meal, record the following information in your food log:  · What you ate. Don't forget to include toppings, sauces, and other extras on the food.  · How much you ate. This can be measured in cups, ounces, or number of items.  · How many calories each food and drink had.  · The total number of calories in the meal.  Keep your food log near you, such as in a small notebook in your pocket, or use a mobile kip or website. Some programs will calculate calories for you and show you how many calories you have left for the day to meet your goal.  What are some calorie counting tips?    · Use your calories on foods and drinks that will fill you up and not leave you hungry:  ? Some examples of foods that fill you up are nuts and nut butters, vegetables, lean proteins, and high-fiber foods like whole grains. High-fiber foods are foods with more than 5 g fiber per serving.  ? Drinks such as sodas, specialty coffee drinks, alcohol, and juices have a lot of calories, yet do not fill you up.  · Eat nutritious foods and avoid empty calories. Empty calories are calories you get from foods or beverages that do not have many vitamins or protein, such as candy, sweets, and  "soda. It is better to have a nutritious high-calorie food (such as an avocado) than a food with few nutrients (such as a bag of chips).  · Know how many calories are in the foods you eat most often. This will help you calculate calorie counts faster.  · Pay attention to calories in drinks. Low-calorie drinks include water and unsweetened drinks.  · Pay attention to nutrition labels for \"low fat\" or \"fat free\" foods. These foods sometimes have the same amount of calories or more calories than the full fat versions. They also often have added sugar, starch, or salt, to make up for flavor that was removed with the fat.  · Find a way of tracking calories that works for you. Get creative. Try different apps or programs if writing down calories does not work for you.  What are some portion control tips?  · Know how many calories are in a serving. This will help you know how many servings of a certain food you can have.  · Use a measuring cup to measure serving sizes. You could also try weighing out portions on a kitchen scale. With time, you will be able to estimate serving sizes for some foods.  · Take some time to put servings of different foods on your favorite plates, bowls, and cups so you know what a serving looks like.  · Try not to eat straight from a bag or box. Doing this can lead to overeating. Put the amount you would like to eat in a cup or on a plate to make sure you are eating the right portion.  · Use smaller plates, glasses, and bowls to prevent overeating.  · Try not to multitask (for example, watch TV or use your computer) while eating. If it is time to eat, sit down at a table and enjoy your food. This will help you to know when you are full. It will also help you to be aware of what you are eating and how much you are eating.  What are tips for following this plan?  Reading food labels  · Check the calorie count compared to the serving size. The serving size may be smaller than what you are used to " "eating.  · Check the source of the calories. Make sure the food you are eating is high in vitamins and protein and low in saturated and trans fats.  Shopping  · Read nutrition labels while you shop. This will help you make healthy decisions before you decide to purchase your food.  · Make a grocery list and stick to it.  Cooking  · Try to cook your favorite foods in a healthier way. For example, try baking instead of frying.  · Use low-fat dairy products.  Meal planning  · Use more fruits and vegetables. Half of your plate should be fruits and vegetables.  · Include lean proteins like poultry and fish.  How do I count calories when eating out?  · Ask for smaller portion sizes.  · Consider sharing an entree and sides instead of getting your own entree.  · If you get your own entree, eat only half. Ask for a box at the beginning of your meal and put the rest of your entree in it so you are not tempted to eat it.  · If calories are listed on the menu, choose the lower calorie options.  · Choose dishes that include vegetables, fruits, whole grains, low-fat dairy products, and lean protein.  · Choose items that are boiled, broiled, grilled, or steamed. Stay away from items that are buttered, battered, fried, or served with cream sauce. Items labeled \"crispy\" are usually fried, unless stated otherwise.  · Choose water, low-fat milk, unsweetened iced tea, or other drinks without added sugar. If you want an alcoholic beverage, choose a lower calorie option such as a glass of wine or light beer.  · Ask for dressings, sauces, and syrups on the side. These are usually high in calories, so you should limit the amount you eat.  · If you want a salad, choose a garden salad and ask for grilled meats. Avoid extra toppings like thomas, cheese, or fried items. Ask for the dressing on the side, or ask for olive oil and vinegar or lemon to use as dressing.  · Estimate how many servings of a food you are given. For example, a serving of " cooked rice is ½ cup or about the size of half a baseball. Knowing serving sizes will help you be aware of how much food you are eating at restaurants. The list below tells you how big or small some common portion sizes are based on everyday objects:  ? 1 oz--4 stacked dice.  ? 3 oz--1 deck of cards.  ? 1 tsp--1 die.  ? 1 Tbsp--½ a ping-pong ball.  ? 2 Tbsp--1 ping-pong ball.  ? ½ cup--½ baseball.  ? 1 cup--1 baseball.  Summary  · Calorie counting means keeping track of how many calories you eat and drink each day. If you eat fewer calories than your body needs, you should lose weight.  · A healthy amount of weight to lose per week is usually 1-2 lb (0.5-0.9 kg). This usually means reducing your daily calorie intake by 500-750 calories.  · The number of calories in a food can be found on a Nutrition Facts label. If a food does not have a Nutrition Facts label, try to look up the calories online or ask your dietitian for help.  · Use your calories on foods and drinks that will fill you up, and not on foods and drinks that will leave you hungry.  · Use smaller plates, glasses, and bowls to prevent overeating.  This information is not intended to replace advice given to you by your health care provider. Make sure you discuss any questions you have with your health care provider.  Document Released: 12/18/2006 Document Revised: 09/06/2019 Document Reviewed: 11/17/2017  [employer] Patient Education © 2020 [employer]      Exercising to Lose Weight  Exercise is structured, repetitive physical activity to improve fitness and health. Getting regular exercise is important for everyone. It is especially important if you are overweight. Being overweight increases your risk of heart disease, stroke, diabetes, high blood pressure, and several types of cancer. Reducing your calorie intake and exercising can help you lose weight.  Exercise is usually categorized as moderate or vigorous intensity. To lose weight, most people need  to do a certain amount of moderate-intensity or vigorous-intensity exercise each week.  Moderate-intensity exercise    Moderate-intensity exercise is any activity that gets you moving enough to burn at least three times more energy (calories) than if you were sitting.  Examples of moderate exercise include:  · Walking a mile in 15 minutes.  · Doing light yard work.  · Biking at an easy pace.  Most people should get at least 150 minutes (2 hours and 30 minutes) a week of moderate-intensity exercise to maintain their body weight.  Vigorous-intensity exercise  Vigorous-intensity exercise is any activity that gets you moving enough to burn at least six times more calories than if you were sitting. When you exercise at this intensity, you should be working hard enough that you are not able to carry on a conversation.  Examples of vigorous exercise include:  · Running.  · Playing a team sport, such as football, basketball, and soccer.  · Jumping rope.  Most people should get at least 75 minutes (1 hour and 15 minutes) a week of vigorous-intensity exercise to maintain their body weight.  How can exercise affect me?  When you exercise enough to burn more calories than you eat, you lose weight. Exercise also reduces body fat and builds muscle. The more muscle you have, the more calories you burn. Exercise also:  · Improves mood.  · Reduces stress and tension.  · Improves your overall fitness, flexibility, and endurance.  · Increases bone strength.  The amount of exercise you need to lose weight depends on:  · Your age.  · The type of exercise.  · Any health conditions you have.  · Your overall physical ability.  Talk to your health care provider about how much exercise you need and what types of activities are safe for you.  What actions can I take to lose weight?  Nutrition    · Make changes to your diet as told by your health care provider or diet and nutrition specialist (dietitian). This may include:  ? Eating fewer  calories.  ? Eating more protein.  ? Eating less unhealthy fats.  ? Eating a diet that includes fresh fruits and vegetables, whole grains, low-fat dairy products, and lean protein.  ? Avoiding foods with added fat, salt, and sugar.  · Drink plenty of water while you exercise to prevent dehydration or heat stroke.  Activity  · Choose an activity that you enjoy and set realistic goals. Your health care provider can help you make an exercise plan that works for you.  · Exercise at a moderate or vigorous intensity most days of the week.  ? The intensity of exercise may vary from person to person. You can tell how intense a workout is for you by paying attention to your breathing and heartbeat. Most people will notice their breathing and heartbeat get faster with more intense exercise.  · Do resistance training twice each week, such as:  ? Push-ups.  ? Sit-ups.  ? Lifting weights.  ? Using resistance bands.  · Getting short amounts of exercise can be just as helpful as long structured periods of exercise. If you have trouble finding time to exercise, try to include exercise in your daily routine.  ? Get up, stretch, and walk around every 30 minutes throughout the day.  ? Go for a walk during your lunch break.  ? Park your car farther away from your destination.  ? If you take public transportation, get off one stop early and walk the rest of the way.  ? Make phone calls while standing up and walking around.  ? Take the stairs instead of elevators or escalators.  · Wear comfortable clothes and shoes with good support.  · Do not exercise so much that you hurt yourself, feel dizzy, or get very short of breath.  Where to find more information  · [place] Department of Health and Human Services: www.hhs.gov  · Centers for Disease Control and Prevention (CDC): www.cdc.gov  Contact a health care provider:  · Before starting a new exercise program.  · If you have questions or concerns about your weight.  · If you have a medical  problem that keeps you from exercising.  Get help right away if you have any of the following while exercising:  · Injury.  · Dizziness.  · Difficulty breathing or shortness of breath that does not go away when you stop exercising.  · Chest pain.  · Rapid heartbeat.  Summary  · Being overweight increases your risk of heart disease, stroke, diabetes, high blood pressure, and several types of cancer.  · Losing weight happens when you burn more calories than you eat.  · Reducing the amount of calories you eat in addition to getting regular moderate or vigorous exercise each week helps you lose weight.  This information is not intended to replace advice given to you by your health care provider. Make sure you discuss any questions you have with your health care provider.  Document Released: 01/20/2012 Document Revised: 12/31/2018 Document Reviewed: 12/31/2018  [employer] Patient Education © 2020 [employer]

## 2020-06-09 NOTE — PROGRESS NOTES
Subjective        History of Present Illness     Zheng Viera is a 39 y.o. male who receives care via video visit for 6-month follow up hypertriglyceridemia, obesity, and hypogonadism, for which he continues on testosterone injections       We referred patient to [facility] to evaluate sleep apnea symptoms, for which he was started on CPAP    When he was here six months ago, he had started taking 3000 units of B-12 twice daily.    I recommended he decrease B-12 to three times weekly.  Vitamin B-12 is at goal at 823.           The patient's relevant past medical, surgical, and social history was reviewed in Epic.   Lab results are reviewed with the patient today.    Review of Systems   Constitutional: Negative for chills, fatigue and fever.   HENT: Negative for congestion, ear pain, postnasal drip, sinus pressure and sore throat.    Respiratory: Negative for cough, shortness of breath and wheezing.    Cardiovascular: Negative for chest pain, palpitations and leg swelling.   Gastrointestinal: Negative for abdominal pain, blood in stool, constipation, diarrhea, nausea and vomiting.   Endocrine: Negative for cold intolerance, heat intolerance, polydipsia and polyuria.   Genitourinary: Negative for dysuria, frequency, hematuria and urgency.   Skin: Negative for rash.   Neurological: Negative for syncope and weakness.       Objective       Physical Exam      Assessment/Plan      Scribed for Dr. Benitez by Ashlie Domínguez [facility].     There are no diagnoses linked to this encounter.    Lab on 06/03/2020   Component Date Value Ref Range Status   • WBC 06/03/2020 7.63  3.40 - 10.80 10*3/mm3 Final   • RBC 06/03/2020 5.08  4.14 - 5.80 10*6/mm3 Final   • Hemoglobin 06/03/2020 16.4  13.0 - 17.7 g/dL Final   • Hematocrit 06/03/2020 47.8  37.5 - 51.0 % Final   • MCV 06/03/2020 94.1  79.0 - 97.0 fL Final   • MCH 06/03/2020 32.3  26.6 - 33.0 pg Final   • MCHC 06/03/2020 34.3  31.5 - 35.7 g/dL Final   • RDW 06/03/2020 12.9   12.3 - 15.4 % Final   • RDW-SD 06/03/2020 43.4  37.0 - 54.0 fl Final   • MPV 06/03/2020 10.4  6.0 - 12.0 fL Final   • Platelets 06/03/2020 300  140 - 450 10*3/mm3 Final   • Neutrophil % 06/03/2020 72.9  42.7 - 76.0 % Final   • Lymphocyte % 06/03/2020 15.3* 19.6 - 45.3 % Final   • Monocyte % 06/03/2020 9.2  5.0 - 12.0 % Final   • Eosinophil % 06/03/2020 1.8  0.3 - 6.2 % Final   • Basophil % 06/03/2020 0.8  0.0 - 1.5 % Final   • Neutrophils, Absolute 06/03/2020 5.56  1.70 - 7.00 10*3/mm3 Final   • Lymphocytes, Absolute 06/03/2020 1.17  0.70 - 3.10 10*3/mm3 Final   • Monocytes, Absolute 06/03/2020 0.70  0.10 - 0.90 10*3/mm3 Final   • Eosinophils, Absolute 06/03/2020 0.14  0.00 - 0.40 10*3/mm3 Final   • Basophils, Absolute 06/03/2020 0.06  0.00 - 0.20 10*3/mm3 Final   • Glucose 06/03/2020 96  70 - 99 mg/dL Final   • BUN 06/03/2020 16  7 - 23 mg/dL Final   • Creatinine 06/03/2020 1.13  0.70 - 1.30 mg/dL Final   • Sodium 06/03/2020 136* 137 - 145 mmol/L Final   • Potassium 06/03/2020 4.3  3.4 - 5.0 mmol/L Final   • Chloride 06/03/2020 103  101 - 112 mmol/L Final   • CO2 06/03/2020 28.0  22.0 - 30.0 mmol/L Final   • Calcium 06/03/2020 9.0  8.4 - 10.2 mg/dL Final   • Total Protein 06/03/2020 6.8  6.3 - 8.6 g/dL Final   • Albumin 06/03/2020 4.20  3.50 - 5.00 g/dL Final   • ALT (SGPT) 06/03/2020 45  <=50 U/L Final   • AST (SGOT) 06/03/2020 36  17 - 59 U/L Final   • Alkaline Phosphatase 06/03/2020 59  38 - 126 U/L Final   • Total Bilirubin 06/03/2020 1.1  0.2 - 1.3 mg/dL Final   • eGFR Non African Amer 06/03/2020 73  70 - 162 mL/min/1.73 Final   • Globulin 06/03/2020 2.6  2.3 - 3.5 gm/dL Final   • A/G Ratio 06/03/2020 1.6  1.1 - 1.8 g/dL Final   • BUN/Creatinine Ratio 06/03/2020 14.2  7.0 - 25.0 Final   • Anion Gap 06/03/2020 5.0  5.0 - 15.0 mmol/L Final   • Total Cholesterol 06/03/2020 162  150 - 200 mg/dL Final   • Triglycerides 06/03/2020 85  <=150 mg/dL Final   • HDL Cholesterol 06/03/2020 41  40 - 59 mg/dL Final   • LDL  Cholesterol  06/03/2020 104* <=100 mg/dL Final   • VLDL Cholesterol 06/03/2020 17  mg/dL Final   • LDL/HDL Ratio 06/03/2020 2.54  0.00 - 3.55 Final   • Vitamin B-12 06/03/2020 823  211 - 946 pg/mL Final   • Testosterone, Total 06/03/2020 290  264 - 916 ng/dL Final    Adult male reference interval is based on a population of  healthy nonobese males (BMI <30) between 19 and 39 years old.  Buffy et.al. JCEM 2017,102;6781-5329. PMID: [id number].   • Testosterone, Free 06/03/2020 7.4* 8.7 - 25.1 pg/mL Final   • TSH 06/03/2020 2.970  0.270 - 4.200 uIU/mL Final   ]

## 2020-06-09 NOTE — PROGRESS NOTES
Subjective        History of Present Illness     You have chosen to receive care through a telehealth visit.  Do you consent to use a video/audio connection for your medical care today? Yes    Zheng Viera is a 39 y.o. male who receives care via video visit today for 6-month follow up on hypertriglyceridemia, obesity, and hypogonadism, for which he continues on testosterone injections.    We referred patient to Donavon Prieto to evaluate sleep apnea symptoms, for which he was started on CPAP.  He feels much better when he gets a good night's sleep. He reports a weight loss of 11 pounds, which has normalized liver enzymes and improved cholesterol and triglycerides as well as overall health.       We referred to Dr. Behzad Wheeler to address male hypogonadism who recommended micro  doses frequent 50 mg twice weekly.  Patient reports with this plan, he was more energetic initally for a couple of days. He wanted to evaluate estradiol as well as testosterone levels 2 days post 50 mg subcu testosterone.  Patient had labs, although, his follow up visit with Dr. Wheeler was cancelled due to the pandemic. I recommended he call and reschedule the appointment.  He is in agreement.       Polycythemia has also improved with weight loss.       When he was here six months ago, he had started taking 3000 units of B-12 twice daily.   I recommended he decrease B-12 to three times weekly, which has vitamin B-12 at goal at 823.       The patient's relevant past medical, surgical, and social history was reviewed in Epic.   Lab results are reviewed with the patient today.  CBC unremarkable.  Total cholesterol 162.  HDL 41.  .  Triglycerides 85.  LDL/HDL ratio 2.54. Vitamin B-12 at goal. Normal renal and liver function.      Review of Systems   Constitutional: Negative for chills, fatigue and fever.   HENT: Negative for congestion, ear pain, postnasal drip, sinus pressure and sore throat.    Respiratory: Negative for cough,  "shortness of breath and wheezing.    Cardiovascular: Negative for chest pain, palpitations and leg swelling.   Gastrointestinal: Negative for abdominal pain, blood in stool, constipation, diarrhea, nausea and vomiting.   Endocrine: Negative for cold intolerance, heat intolerance, polydipsia and polyuria.   Genitourinary: Negative for dysuria, frequency, hematuria and urgency.   Skin: Negative for rash.   Neurological: Negative for syncope and weakness.        Objective     Visit Vitals  Ht 177.3 cm (69.8\")   Wt 102 kg (225 lb)   BMI 32.47 kg/m²     Physical Exam      Future Appointments   Date Time Provider Department Center   3/10/2021  9:00 AM Shelli Jarrell, APRN [facility] None     Assessment/Plan      The elevated triglycerides have improved with weight loss and diet changes.  Continue low-carb/low-fat diet and increased aerobic exercise.    Recommended patient call and reschedule his cancelled appointment with Dr. Wheeler who is following/managing his hypogonadism.  We refilled syringes and needles today.    He is congratulated on his weight loss and encouraged to intensify diet, exercise and weight loss efforts.     Polycythemia improved with current testosterone dosing.      Liver enzymes have normalized with weight loss.      Continue the CPAP and follow up visits with sleep medicine at [facility].      Continue current oral B-12 supplement.      Return in six months for follow up with fasting labs one week prior or sooner if needed.        Scribed for Dr. Benitez by Ashlie Domínguez [facility].     Diagnoses and all orders for this visit:    Male hypogonadism  -     Needle, Disp, (BD Hypodermic Needle) 18G X 1\" misc; Use to draw up testosterone  -     B-D 3CC LUER-TRINITY SYR 21QP5-6/2 23G X 1-1/2\" 3 ML misc; Use to inject Testosterone twice weekly    Other orders  -     B-D 3CC LUER-TRINITY SYR 26NN2-8/2 23G X 1-1/2\" 3 ML misc; USE TO INJECT TWICE WEEKLY        Lab on 06/03/2020   Component Date " Value Ref Range Status   • WBC 06/03/2020 7.63  3.40 - 10.80 10*3/mm3 Final   • RBC 06/03/2020 5.08  4.14 - 5.80 10*6/mm3 Final   • Hemoglobin 06/03/2020 16.4  13.0 - 17.7 g/dL Final   • Hematocrit 06/03/2020 47.8  37.5 - 51.0 % Final   • MCV 06/03/2020 94.1  79.0 - 97.0 fL Final   • MCH 06/03/2020 32.3  26.6 - 33.0 pg Final   • MCHC 06/03/2020 34.3  31.5 - 35.7 g/dL Final   • RDW 06/03/2020 12.9  12.3 - 15.4 % Final   • RDW-SD 06/03/2020 43.4  37.0 - 54.0 fl Final   • MPV 06/03/2020 10.4  6.0 - 12.0 fL Final   • Platelets 06/03/2020 300  140 - 450 10*3/mm3 Final   • Neutrophil % 06/03/2020 72.9  42.7 - 76.0 % Final   • Lymphocyte % 06/03/2020 15.3* 19.6 - 45.3 % Final   • Monocyte % 06/03/2020 9.2  5.0 - 12.0 % Final   • Eosinophil % 06/03/2020 1.8  0.3 - 6.2 % Final   • Basophil % 06/03/2020 0.8  0.0 - 1.5 % Final   • Neutrophils, Absolute 06/03/2020 5.56  1.70 - 7.00 10*3/mm3 Final   • Lymphocytes, Absolute 06/03/2020 1.17  0.70 - 3.10 10*3/mm3 Final   • Monocytes, Absolute 06/03/2020 0.70  0.10 - 0.90 10*3/mm3 Final   • Eosinophils, Absolute 06/03/2020 0.14  0.00 - 0.40 10*3/mm3 Final   • Basophils, Absolute 06/03/2020 0.06  0.00 - 0.20 10*3/mm3 Final   • Glucose 06/03/2020 96  70 - 99 mg/dL Final   • BUN 06/03/2020 16  7 - 23 mg/dL Final   • Creatinine 06/03/2020 1.13  0.70 - 1.30 mg/dL Final   • Sodium 06/03/2020 136* 137 - 145 mmol/L Final   • Potassium 06/03/2020 4.3  3.4 - 5.0 mmol/L Final   • Chloride 06/03/2020 103  101 - 112 mmol/L Final   • CO2 06/03/2020 28.0  22.0 - 30.0 mmol/L Final   • Calcium 06/03/2020 9.0  8.4 - 10.2 mg/dL Final   • Total Protein 06/03/2020 6.8  6.3 - 8.6 g/dL Final   • Albumin 06/03/2020 4.20  3.50 - 5.00 g/dL Final   • ALT (SGPT) 06/03/2020 45  <=50 U/L Final   • AST (SGOT) 06/03/2020 36  17 - 59 U/L Final   • Alkaline Phosphatase 06/03/2020 59  38 - 126 U/L Final   • Total Bilirubin 06/03/2020 1.1  0.2 - 1.3 mg/dL Final   • eGFR Non African Amer 06/03/2020 73  70 - 162  mL/min/1.73 Final   • Globulin 06/03/2020 2.6  2.3 - 3.5 gm/dL Final   • A/G Ratio 06/03/2020 1.6  1.1 - 1.8 g/dL Final   • BUN/Creatinine Ratio 06/03/2020 14.2  7.0 - 25.0 Final   • Anion Gap 06/03/2020 5.0  5.0 - 15.0 mmol/L Final   • Total Cholesterol 06/03/2020 162  150 - 200 mg/dL Final   • Triglycerides 06/03/2020 85  <=150 mg/dL Final   • HDL Cholesterol 06/03/2020 41  40 - 59 mg/dL Final   • LDL Cholesterol  06/03/2020 104* <=100 mg/dL Final   • VLDL Cholesterol 06/03/2020 17  mg/dL Final   • LDL/HDL Ratio 06/03/2020 2.54  0.00 - 3.55 Final   • Vitamin B-12 06/03/2020 823  211 - 946 pg/mL Final   • Testosterone, Total 06/03/2020 290  264 - 916 ng/dL Final    Adult male reference interval is based on a population of  healthy nonobese males (BMI <30) between 19 and 39 years old.  Buffy et.al. JCEM 2017,102;2652-9803. PMID: 51277624.   • Testosterone, Free 06/03/2020 7.4* 8.7 - 25.1 pg/mL Final   • TSH 06/03/2020 2.970  0.270 - 4.200 uIU/mL Final   ]

## 2020-07-06 ENCOUNTER — TELEMEDICINE (OUTPATIENT)
Dept: ENDOCRINOLOGY | Facility: CLINIC | Age: 40
End: 2020-07-06

## 2020-07-06 DIAGNOSIS — E29.1 HYPOGONADISM IN MALE: Primary | ICD-10-CM

## 2020-07-06 PROCEDURE — 99213 OFFICE O/P EST LOW 20 MIN: CPT | Performed by: NURSE PRACTITIONER

## 2020-07-06 NOTE — PROGRESS NOTES
" Zheng Viera is a 39 y.o. male who presents for  evaluation of   Chief Complaint   Patient presents with   • Hypogonadism     You have chosen to receive care through a telehealth visit.  Do you consent to use a video/audio connection for your medical care today? Yes    This video visit lasted 13 minutes     Referring provider    No referring provider defined for this encounter.    Primary Care Provider    Chris Benitez MD      40 yo male    Male Hypogonadism    Duration , dating back to 2018    Timing , constant    Quality , partial control    Modifying Factor, doing testosterone intramuscular    Complications/severity, patient has developed polycythemia and obstructive sleep apnea.    Additional symptoms, fatigue/weakness and libido have improved, however still fatigued           Past Medical History:   Diagnosis Date   • Class 1 obesity due to excess calories without serious comorbidity with body mass index (BMI) of 34.0 to 34.9 in adult 4/24/2018   • Grade I hemorrhoids 4/24/2018   • Hypertriglyceridemia - mild 4/24/2018   • Male hypogonadism 5/18/2018     Family History   Problem Relation Age of Onset   • Diabetes Neg Hx      Social History     Tobacco Use   • Smoking status: Never Smoker   • Smokeless tobacco: Never Used   Substance Use Topics   • Alcohol use: No     Comment: social drinker   • Drug use: No         Current Outpatient Medications:   •  B-D 3CC LUER-TRINITY SYR 05BB4-4/2 23G X 1-1/2\" 3 ML misc, Use to inject Testosterone twice weekly, Disp: 25 each, Rfl: 5  •  fluticasone (FLONASE) 50 MCG/ACT nasal spray, 1 spray into each nostril., Disp: , Rfl:   •  Insulin Syringe 31G X 5/16\" 0.5 ML misc, Use as indicated, Disp: 50 each, Rfl: 11  •  Levocetirizine Dihydrochloride (XYZAL PO), Take  by mouth., Disp: , Rfl:   •  Needle, Disp, (BD DISP NEEDLE) 23G X 1\" misc, Use twcie weekly, Disp: 8 each, Rfl: 11  •  Needle, Disp, (BD Hypodermic Needle) 18G X 1\" misc, Use to draw up testosterone, Disp: 25 " each, Rfl: 1  •  sharps container, 1 each As Needed (discard needles)., Disp: 1 each, Rfl: 1  •  Testosterone Cypionate (DEPOTESTOTERONE CYPIONATE) 200 MG/ML injection, 50 mg subcutaneously/IM twice weekly, provide #8 (21G,18G,3cc syringes) per month, Disp: 10 mL, Rfl: 5  •  vitamin B-12 (CYANOCOBALAMIN) 500 MCG tablet, Take 1 tablet by mouth Daily., Disp: , Rfl:     Review of Systems    Review of Systems   Constitutional: Negative for activity change, appetite change, chills, diaphoresis, fatigue, fever and unexpected weight change.   HENT: Negative for congestion, dental problem, drooling, ear discharge, ear pain, facial swelling, mouth sores, postnasal drip, rhinorrhea, sinus pressure, sore throat, tinnitus, trouble swallowing and voice change.    Eyes: Negative for photophobia, pain, discharge, redness, itching and visual disturbance.   Respiratory: Negative for apnea, cough, choking, chest tightness, shortness of breath, wheezing and stridor.    Cardiovascular: Negative for chest pain, palpitations and leg swelling.   Gastrointestinal: Negative for abdominal distention, abdominal pain, constipation, diarrhea, nausea and vomiting.   Endocrine: Negative for cold intolerance, heat intolerance, polydipsia, polyphagia and polyuria.   Genitourinary: Negative for decreased urine volume, difficulty urinating, dysuria, flank pain, frequency, hematuria and urgency.   Musculoskeletal: Negative for arthralgias, back pain, gait problem, joint swelling, myalgias, neck pain and neck stiffness.   Skin: Negative for color change, pallor, rash and wound.   Allergic/Immunologic: Negative for immunocompromised state.   Neurological: Negative for dizziness, tremors, seizures, syncope, facial asymmetry, speech difficulty, weakness, light-headedness, numbness and headaches.   Hematological: Negative for adenopathy.   Psychiatric/Behavioral: Negative for agitation, behavioral problems, confusion, decreased concentration, dysphoric  mood, hallucinations, self-injury, sleep disturbance and suicidal ideas. The patient is not nervous/anxious and is not hyperactive.         Objective:   There were no vitals taken for this visit.    Physical Exam   Constitutional: He is oriented to person, place, and time.   Neurological: He is alert and oriented to person, place, and time.   Psychiatric: He has a normal mood and affect. His speech is normal and behavior is normal. Judgment and thought content normal. Cognition and memory are normal.       Lab Review    Results for orders placed or performed in visit on 06/03/20   CBC Auto Differential   Result Value Ref Range    WBC 7.63 3.40 - 10.80 10*3/mm3    RBC 5.08 4.14 - 5.80 10*6/mm3    Hemoglobin 16.4 13.0 - 17.7 g/dL    Hematocrit 47.8 37.5 - 51.0 %    MCV 94.1 79.0 - 97.0 fL    MCH 32.3 26.6 - 33.0 pg    MCHC 34.3 31.5 - 35.7 g/dL    RDW 12.9 12.3 - 15.4 %    RDW-SD 43.4 37.0 - 54.0 fl    MPV 10.4 6.0 - 12.0 fL    Platelets 300 140 - 450 10*3/mm3    Neutrophil % 72.9 42.7 - 76.0 %    Lymphocyte % 15.3 (L) 19.6 - 45.3 %    Monocyte % 9.2 5.0 - 12.0 %    Eosinophil % 1.8 0.3 - 6.2 %    Basophil % 0.8 0.0 - 1.5 %    Neutrophils, Absolute 5.56 1.70 - 7.00 10*3/mm3    Lymphocytes, Absolute 1.17 0.70 - 3.10 10*3/mm3    Monocytes, Absolute 0.70 0.10 - 0.90 10*3/mm3    Eosinophils, Absolute 0.14 0.00 - 0.40 10*3/mm3    Basophils, Absolute 0.06 0.00 - 0.20 10*3/mm3   Comprehensive Metabolic Panel   Result Value Ref Range    Glucose 96 70 - 99 mg/dL    BUN 16 7 - 23 mg/dL    Creatinine 1.13 0.70 - 1.30 mg/dL    Sodium 136 (L) 137 - 145 mmol/L    Potassium 4.3 3.4 - 5.0 mmol/L    Chloride 103 101 - 112 mmol/L    CO2 28.0 22.0 - 30.0 mmol/L    Calcium 9.0 8.4 - 10.2 mg/dL    Total Protein 6.8 6.3 - 8.6 g/dL    Albumin 4.20 3.50 - 5.00 g/dL    ALT (SGPT) 45 <=50 U/L    AST (SGOT) 36 17 - 59 U/L    Alkaline Phosphatase 59 38 - 126 U/L    Total Bilirubin 1.1 0.2 - 1.3 mg/dL    eGFR Non  Amer 73 70 - 162  mL/min/1.73    Globulin 2.6 2.3 - 3.5 gm/dL    A/G Ratio 1.6 1.1 - 1.8 g/dL    BUN/Creatinine Ratio 14.2 7.0 - 25.0    Anion Gap 5.0 5.0 - 15.0 mmol/L   Lipid Panel   Result Value Ref Range    Total Cholesterol 162 150 - 200 mg/dL    Triglycerides 85 <=150 mg/dL    HDL Cholesterol 41 40 - 59 mg/dL    LDL Cholesterol  104 (H) <=100 mg/dL    VLDL Cholesterol 17 mg/dL    LDL/HDL Ratio 2.54 0.00 - 3.55   Vitamin B12   Result Value Ref Range    Vitamin B-12 823 211 - 946 pg/mL   Testosterone, Free, Total   Result Value Ref Range    Testosterone, Total 290 264 - 916 ng/dL    Testosterone, Free 7.4 (L) 8.7 - 25.1 pg/mL   TSH   Result Value Ref Range    TSH 2.970 0.270 - 4.200 uIU/mL         Assessment/Plan       ICD-10-CM ICD-9-CM   1. Hypogonadism in male E29.1 257.2         I reviewed and summarized records from Chris Benitez MD from current year  and I reviewed / ordered labs.   From review of records :    Patient has male hypogonadism dating back to 2018.    Presently doing testosterone intramuscular 100 mg weekly.    We discussed about the ongoing dilemma of estrogen blockade.    Rather than blocking estrogen we will do micro-dosing, 50 mg subcutaneously of testosterone cypionate twice weekly or of Xyosted if insurance approves- Insurance did not approve    We will evaluate estradiol as well as testosterone 2 days post 50 mg subcu testosterone.    Rotating 50mg IM/ also doing Sub Q- 2 days a week       Obstructive sleep apnea, patient is already seeing Dr. Prieto and is compliant with CPAP        6/2020 CBC- NL      Orders Placed This Encounter   Procedures   • Testosterone, Free, Total     Standing Status:   Future     Standing Expiration Date:   7/6/2021   • Testosterone     Standing Status:   Future     Standing Expiration Date:   7/6/2021   • PSA Total, Reflex To Free     Standing Status:   Future     Standing Expiration Date:   7/6/2021   • Comprehensive Metabolic Panel     Standing Status:   Future     Standing  Expiration Date:   7/6/2021   • CBC & Differential     Standing Status:   Future     Standing Expiration Date:   7/6/2021     Order Specific Question:   Manual Differential     Answer:   No         A copy of my note was sent to Chris Benitez MD    Please see my above opinion and suggestions.           This document has been electronically signed by OTTO Echeverria on July 6, 2020 10:34

## 2020-07-15 ENCOUNTER — TELEPHONE (OUTPATIENT)
Dept: ENDOCRINOLOGY | Facility: CLINIC | Age: 40
End: 2020-07-15

## 2020-07-15 NOTE — TELEPHONE ENCOUNTER
Says that he needs a refill on his testosterone sent to [facility] pharmacy in [place]. Says its the big vial that last about 3 months

## 2020-07-22 DIAGNOSIS — D75.1 POLYCYTHEMIA: ICD-10-CM

## 2020-07-22 DIAGNOSIS — E29.1 HYPOGONADISM IN MALE: ICD-10-CM

## 2020-07-22 DIAGNOSIS — E29.1 MALE HYPOGONADISM: Chronic | ICD-10-CM

## 2020-07-22 RX ORDER — TESTOSTERONE CYPIONATE 200 MG/ML
INJECTION, SOLUTION INTRAMUSCULAR
Qty: 10 ML | Refills: 5 | Status: SHIPPED | OUTPATIENT
Start: 2020-07-22 | End: 2020-09-11 | Stop reason: SDUPTHER

## 2020-07-22 RX ORDER — NAPROXEN SODIUM 220 MG
TABLET ORAL
Qty: 50 EACH | Refills: 11 | Status: SHIPPED | OUTPATIENT
Start: 2020-07-22

## 2020-07-23 ENCOUNTER — TELEPHONE (OUTPATIENT)
Dept: ENDOCRINOLOGY | Facility: CLINIC | Age: 40
End: 2020-07-23

## 2020-08-04 ENCOUNTER — TELEPHONE (OUTPATIENT)
Dept: ENDOCRINOLOGY | Facility: CLINIC | Age: 40
End: 2020-08-04

## 2020-08-04 NOTE — TELEPHONE ENCOUNTER
[facility] - [place], [place] - [address] - [phone] [facility] [phone] FX    Marci with [facility] called said they rec order for testosterone that insurance will not cover and needs us to let her know if we had filled somewhere else and do they need to discontinue.     Thank You

## 2020-08-04 NOTE — TELEPHONE ENCOUNTER
I called and spoke with pharmacy and let them know that he has picked up his last script at [facility] and to cancel the script there at [facility]

## 2020-08-28 ENCOUNTER — TELEPHONE (OUTPATIENT)
Dept: ENDOCRINOLOGY | Facility: CLINIC | Age: 40
End: 2020-08-28

## 2020-08-28 NOTE — TELEPHONE ENCOUNTER
Pt is needing labs ordered for testosterone before the appointment on Sept 4th. Says this would be to restart his testosterone, due to his PCP not having a correct baseline for the last labs that were done. Pt has not taken test. In about two months, he says, and is ready to restart.     Please call him and let him know they are in, or let us know to call him. p

## 2020-08-28 NOTE — TELEPHONE ENCOUNTER
Called pt to let him know that his lab order is in the system and he can go in before his appt. On Sept. 4

## 2020-09-04 ENCOUNTER — LAB (OUTPATIENT)
Dept: LAB | Facility: OTHER | Age: 40
End: 2020-09-04

## 2020-09-04 DIAGNOSIS — E29.1 HYPOGONADISM IN MALE: ICD-10-CM

## 2020-09-04 LAB
ALBUMIN SERPL-MCNC: 4.1 G/DL (ref 3.5–5)
ALBUMIN/GLOB SERPL: 1.4 G/DL (ref 1.1–1.8)
ALP SERPL-CCNC: 97 U/L (ref 38–126)
ALT SERPL W P-5'-P-CCNC: 122 U/L
ANION GAP SERPL CALCULATED.3IONS-SCNC: 7 MMOL/L (ref 5–15)
AST SERPL-CCNC: 58 U/L (ref 17–59)
BASOPHILS # BLD AUTO: 0.12 10*3/MM3 (ref 0–0.2)
BASOPHILS NFR BLD AUTO: 1.7 % (ref 0–1.5)
BILIRUB SERPL-MCNC: 0.9 MG/DL (ref 0.2–1.3)
BUN SERPL-MCNC: 19 MG/DL (ref 7–23)
BUN/CREAT SERPL: 16.1 (ref 7–25)
CALCIUM SPEC-SCNC: 8.9 MG/DL (ref 8.4–10.2)
CHLORIDE SERPL-SCNC: 104 MMOL/L (ref 101–112)
CO2 SERPL-SCNC: 27 MMOL/L (ref 22–30)
CREAT SERPL-MCNC: 1.18 MG/DL (ref 0.7–1.3)
DEPRECATED RDW RBC AUTO: 42.5 FL (ref 37–54)
EOSINOPHIL # BLD AUTO: 0.23 10*3/MM3 (ref 0–0.4)
EOSINOPHIL NFR BLD AUTO: 3.2 % (ref 0.3–6.2)
ERYTHROCYTE [DISTWIDTH] IN BLOOD BY AUTOMATED COUNT: 12.8 % (ref 12.3–15.4)
GFR SERPL CREATININE-BSD FRML MDRD: 69 ML/MIN/1.73 (ref 70–162)
GLOBULIN UR ELPH-MCNC: 3 GM/DL (ref 2.3–3.5)
GLUCOSE SERPL-MCNC: 85 MG/DL (ref 70–99)
HCT VFR BLD AUTO: 45 % (ref 37.5–51)
HGB BLD-MCNC: 15.3 G/DL (ref 13–17.7)
LYMPHOCYTES # BLD AUTO: 0.98 10*3/MM3 (ref 0.7–3.1)
LYMPHOCYTES NFR BLD AUTO: 13.6 % (ref 19.6–45.3)
MCH RBC QN AUTO: 31.7 PG (ref 26.6–33)
MCHC RBC AUTO-ENTMCNC: 34 G/DL (ref 31.5–35.7)
MCV RBC AUTO: 93.2 FL (ref 79–97)
MONOCYTES # BLD AUTO: 0.79 10*3/MM3 (ref 0.1–0.9)
MONOCYTES NFR BLD AUTO: 11 % (ref 5–12)
NEUTROPHILS NFR BLD AUTO: 5.07 10*3/MM3 (ref 1.7–7)
NEUTROPHILS NFR BLD AUTO: 70.5 % (ref 42.7–76)
PLATELET # BLD AUTO: 228 10*3/MM3 (ref 140–450)
PMV BLD AUTO: 10.1 FL (ref 6–12)
POTASSIUM SERPL-SCNC: 4.2 MMOL/L (ref 3.4–5)
PROT SERPL-MCNC: 7.1 G/DL (ref 6.3–8.6)
RBC # BLD AUTO: 4.83 10*6/MM3 (ref 4.14–5.8)
SODIUM SERPL-SCNC: 138 MMOL/L (ref 137–145)
WBC # BLD AUTO: 7.19 10*3/MM3 (ref 3.4–10.8)

## 2020-09-04 PROCEDURE — 36415 COLL VENOUS BLD VENIPUNCTURE: CPT | Performed by: NURSE PRACTITIONER

## 2020-09-04 PROCEDURE — 85025 COMPLETE CBC W/AUTO DIFF WBC: CPT | Performed by: NURSE PRACTITIONER

## 2020-09-04 PROCEDURE — 84153 ASSAY OF PSA TOTAL: CPT | Performed by: NURSE PRACTITIONER

## 2020-09-04 PROCEDURE — 84403 ASSAY OF TOTAL TESTOSTERONE: CPT | Performed by: NURSE PRACTITIONER

## 2020-09-04 PROCEDURE — 84402 ASSAY OF FREE TESTOSTERONE: CPT | Performed by: NURSE PRACTITIONER

## 2020-09-04 PROCEDURE — 80053 COMPREHEN METABOLIC PANEL: CPT | Performed by: NURSE PRACTITIONER

## 2020-09-05 LAB — TESTOST SERPL-MCNC: 212 NG/DL (ref 249–836)

## 2020-09-06 LAB
PSA SERPL-MCNC: 0.6 NG/ML (ref 0–4)
REFLEX: NORMAL

## 2020-09-08 LAB
TESTOST FREE SERPL-MCNC: 2.6 PG/ML (ref 8.7–25.1)
TESTOST SERPL-MCNC: 215 NG/DL (ref 264–916)

## 2020-09-11 ENCOUNTER — OFFICE VISIT (OUTPATIENT)
Dept: ENDOCRINOLOGY | Facility: CLINIC | Age: 40
End: 2020-09-11

## 2020-09-11 VITALS
SYSTOLIC BLOOD PRESSURE: 118 MMHG | BODY MASS INDEX: 32.87 KG/M2 | HEIGHT: 69 IN | WEIGHT: 221.9 LBS | HEART RATE: 103 BPM | DIASTOLIC BLOOD PRESSURE: 82 MMHG | OXYGEN SATURATION: 98 %

## 2020-09-11 DIAGNOSIS — E29.1 MALE HYPOGONADISM: Primary | Chronic | ICD-10-CM

## 2020-09-11 DIAGNOSIS — R68.82 LOW LIBIDO: ICD-10-CM

## 2020-09-11 PROCEDURE — 99214 OFFICE O/P EST MOD 30 MIN: CPT | Performed by: INTERNAL MEDICINE

## 2020-09-11 RX ORDER — SYRINGE WITH NEEDLE, 1 ML 25GX5/8"
SYRINGE, EMPTY DISPOSABLE MISCELLANEOUS
Qty: 24 EACH | Refills: 5 | Status: SHIPPED | OUTPATIENT
Start: 2020-09-11

## 2020-09-11 RX ORDER — MINOCYCLINE HYDROCHLORIDE 100 MG/1
100 CAPSULE ORAL DAILY
COMMUNITY

## 2020-09-11 RX ORDER — TESTOSTERONE CYPIONATE 200 MG/ML
INJECTION, SOLUTION INTRAMUSCULAR
Qty: 10 ML | Refills: 5 | Status: SHIPPED | OUTPATIENT
Start: 2020-09-11 | End: 2021-06-30 | Stop reason: SDUPTHER

## 2020-09-11 NOTE — PROGRESS NOTES
" Zheng Viera is a 39 y.o. male who presents for  evaluation of   Chief Complaint   Patient presents with   • Follow-up     3 month   • Hypogonadism       Primary Care Provider    Chris Benitez MD      40 yo male    Male Hypogonadism    Duration , dating back to 2018    Timing , constant    Modifying Factor, was doing intramuscular testosterone but stopped to reassess. Workup from 8-2020 confirmed low testosterone   Complications/severity, patient has developed polycythemia and obstructive sleep apnea while on treatment     Additional symptoms, fatigue/weakness and low libido off testosterone            Past Medical History:   Diagnosis Date   • Class 1 obesity due to excess calories without serious comorbidity with body mass index (BMI) of 34.0 to 34.9 in adult 4/24/2018   • Grade I hemorrhoids 4/24/2018   • Hypertriglyceridemia - mild 4/24/2018   • Male hypogonadism 5/18/2018     Family History   Problem Relation Age of Onset   • Diabetes Neg Hx      Social History     Tobacco Use   • Smoking status: Never Smoker   • Smokeless tobacco: Never Used   Substance Use Topics   • Alcohol use: No     Comment: social drinker   • Drug use: No         Current Outpatient Medications:   •  B-D 3CC LUER-TRINITY SYR 16BX7-1/2 23G X 1-1/2\" 3 ML misc, Use to inject Testosterone twice weekly, Disp: 24 each, Rfl: 5  •  fluticasone (FLONASE) 50 MCG/ACT nasal spray, 1 spray into each nostril., Disp: , Rfl:   •  Insulin Syringe 31G X 5/16\" 0.5 ML misc, Use as indicated, Disp: 50 each, Rfl: 11  •  Levocetirizine Dihydrochloride (XYZAL PO), Take  by mouth., Disp: , Rfl:   •  minocycline (MINOCIN,DYNACIN) 100 MG capsule, Take 100 mg by mouth Daily., Disp: , Rfl:   •  Needle, Disp, (BD Disp Needle) 23G X 1\" misc, Use twcie weekly, Disp: 8 each, Rfl: 11  •  Needle, Disp, (BD Hypodermic Needle) 18G X 1\" misc, Use to draw up testosterone twice weekly, Disp: 24 each, Rfl: 5  •  sharps container, 1 each As Needed (discard needles)., Disp: " 1 each, Rfl: 1  •  Testosterone Cypionate (DEPOTESTOTERONE CYPIONATE) 200 MG/ML injection, 50 mg subcutaneously/IM twice weekly, provide #8 (21G,18G,3cc syringes) per month, Disp: 10 mL, Rfl: 5  •  vitamin B-12 (CYANOCOBALAMIN) 500 MCG tablet, Take 1 tablet by mouth Daily., Disp: , Rfl:     Review of Systems    Review of Systems   Constitutional: Negative for activity change, appetite change, chills, diaphoresis, fatigue, fever and unexpected weight change.   HENT: Negative for congestion, dental problem, drooling, ear discharge, ear pain, facial swelling, mouth sores, postnasal drip, rhinorrhea, sinus pressure, sore throat, tinnitus, trouble swallowing and voice change.    Eyes: Negative for photophobia, pain, discharge, redness, itching and visual disturbance.   Respiratory: Negative for apnea, cough, choking, chest tightness, shortness of breath, wheezing and stridor.    Cardiovascular: Negative for chest pain, palpitations and leg swelling.   Gastrointestinal: Negative for abdominal distention, abdominal pain, constipation, diarrhea, nausea and vomiting.   Endocrine: Negative for cold intolerance, heat intolerance, polydipsia, polyphagia and polyuria.   Genitourinary: Negative for decreased urine volume, difficulty urinating, dysuria, flank pain, frequency, hematuria and urgency.   Musculoskeletal: Negative for arthralgias, back pain, gait problem, joint swelling, myalgias, neck pain and neck stiffness.   Skin: Negative for color change, pallor, rash and wound.   Allergic/Immunologic: Negative for immunocompromised state.   Neurological: Negative for dizziness, tremors, seizures, syncope, facial asymmetry, speech difficulty, weakness, light-headedness, numbness and headaches.   Hematological: Negative for adenopathy.   Psychiatric/Behavioral: Negative for agitation, behavioral problems, confusion, decreased concentration, dysphoric mood, hallucinations, self-injury, sleep disturbance and suicidal ideas. The  "patient is not nervous/anxious and is not hyperactive.         Objective:   /82   Pulse 103   Ht 175.3 cm (69\")   Wt 101 kg (221 lb 14.4 oz)   SpO2 98%   BMI 32.77 kg/m²     Physical Exam   Constitutional: He is oriented to person, place, and time.   Neurological: He is alert and oriented to person, place, and time.   Psychiatric: He has a normal mood and affect. His speech is normal and behavior is normal. Judgment and thought content normal. Cognition and memory are normal.       Lab Review    Results for orders placed or performed in visit on 09/04/20   Testosterone, Free, Total   Result Value Ref Range    Testosterone, Total 215 (L) 264 - 916 ng/dL    Testosterone, Free 2.6 (L) 8.7 - 25.1 pg/mL   Testosterone   Result Value Ref Range    Testosterone, Total 212.00 (L) 249.00 - 836.00 ng/dL   PSA Total, Reflex To Free   Result Value Ref Range    PSA 0.6 0.0 - 4.0 ng/mL    Reflex Comment    Comprehensive Metabolic Panel   Result Value Ref Range    Glucose 85 70 - 99 mg/dL    BUN 19 7 - 23 mg/dL    Creatinine 1.18 0.70 - 1.30 mg/dL    Sodium 138 137 - 145 mmol/L    Potassium 4.2 3.4 - 5.0 mmol/L    Chloride 104 101 - 112 mmol/L    CO2 27.0 22.0 - 30.0 mmol/L    Calcium 8.9 8.4 - 10.2 mg/dL    Total Protein 7.1 6.3 - 8.6 g/dL    Albumin 4.10 3.50 - 5.00 g/dL    ALT (SGPT) 122 (H) <=50 U/L    AST (SGOT) 58 17 - 59 U/L    Alkaline Phosphatase 97 38 - 126 U/L    Total Bilirubin 0.9 0.2 - 1.3 mg/dL    eGFR Non  Amer 69 (L) 70 - 162 mL/min/1.73    Globulin 3.0 2.3 - 3.5 gm/dL    A/G Ratio 1.4 1.1 - 1.8 g/dL    BUN/Creatinine Ratio 16.1 7.0 - 25.0    Anion Gap 7.0 5.0 - 15.0 mmol/L   CBC Auto Differential   Result Value Ref Range    WBC 7.19 3.40 - 10.80 10*3/mm3    RBC 4.83 4.14 - 5.80 10*6/mm3    Hemoglobin 15.3 13.0 - 17.7 g/dL    Hematocrit 45.0 37.5 - 51.0 %    MCV 93.2 79.0 - 97.0 fL    MCH 31.7 26.6 - 33.0 pg    MCHC 34.0 31.5 - 35.7 g/dL    RDW 12.8 12.3 - 15.4 %    RDW-SD 42.5 37.0 - 54.0 fl    " MPV 10.1 6.0 - 12.0 fL    Platelets 228 140 - 450 10*3/mm3    Neutrophil % 70.5 42.7 - 76.0 %    Lymphocyte % 13.6 (L) 19.6 - 45.3 %    Monocyte % 11.0 5.0 - 12.0 %    Eosinophil % 3.2 0.3 - 6.2 %    Basophil % 1.7 (H) 0.0 - 1.5 %    Neutrophils, Absolute 5.07 1.70 - 7.00 10*3/mm3    Lymphocytes, Absolute 0.98 0.70 - 3.10 10*3/mm3    Monocytes, Absolute 0.79 0.10 - 0.90 10*3/mm3    Eosinophils, Absolute 0.23 0.00 - 0.40 10*3/mm3    Basophils, Absolute 0.12 0.00 - 0.20 10*3/mm3         Assessment/Plan       ICD-10-CM ICD-9-CM   1. Male hypogonadism E29.1 257.2   2. Low libido R68.82 799.81         I reviewed and summarized records from Chris Benitez MD from current year  and I reviewed / ordered labs.   From review of records :    Patient has male hypogonadism dating back to 2018.    Obstructive sleep apnea, patient is already seeing Dr. Prieto and is compliant with CPAP     PSA nl     CBCnl     He stopped treatment for 3 months to reevaluate diagnosis and Free testosterone , even though done again by analogiue method is very low only at 2 and he is symptomatic    Low libido , fatigue    Restart treatment    He can get 10 ml vial of 200 mg per ml     Has been doing 0.3 ml IM ( prefers over sub Q ) twice weekly. We spoke that as long as he doesn't exceed 1 ml weekly we can consider safe dosing.           No orders of the defined types were placed in this encounter.        A copy of my note was sent to Chris Benitez MD    Please see my above opinion and suggestions.           This document has been electronically signed by Harpal Wheeler MD on September 11, 2020 19:48

## 2020-10-29 ENCOUNTER — PRIOR AUTHORIZATION (OUTPATIENT)
Dept: FAMILY MEDICINE CLINIC | Facility: CLINIC | Age: 40
End: 2020-10-29

## 2020-10-29 NOTE — TELEPHONE ENCOUNTER
JAIME MEEKS Key: [id number] - [place] Case ID: [id number] help? Call us at [phone]  Outcome  Approved 10/29/2020  CaseId:[id number];Status:Approved;Review Type:Prior Auth;Coverage Start Date:09/29/2020;Coverage End Date:10/29/2021;  Drug  Testosterone Cypionate 200MG/ML intramuscular solution

## 2021-06-29 ENCOUNTER — TELEPHONE (OUTPATIENT)
Dept: ENDOCRINOLOGY | Facility: CLINIC | Age: 41
End: 2021-06-29

## 2021-06-30 ENCOUNTER — TELEMEDICINE (OUTPATIENT)
Dept: ENDOCRINOLOGY | Facility: CLINIC | Age: 41
End: 2021-06-30

## 2021-06-30 DIAGNOSIS — N40.0 PROSTATISM: Primary | ICD-10-CM

## 2021-06-30 DIAGNOSIS — R68.82 LOW LIBIDO: ICD-10-CM

## 2021-06-30 DIAGNOSIS — E29.1 MALE HYPOGONADISM: Chronic | ICD-10-CM

## 2021-06-30 PROCEDURE — 99214 OFFICE O/P EST MOD 30 MIN: CPT | Performed by: INTERNAL MEDICINE

## 2021-06-30 RX ORDER — NEEDLES, DISPOSABLE 25GX5/8"
NEEDLE, DISPOSABLE MISCELLANEOUS
Qty: 24 EACH | Refills: 5 | Status: SHIPPED | OUTPATIENT
Start: 2021-06-30

## 2021-06-30 RX ORDER — NEEDLES, DISPOSABLE 25GX5/8"
NEEDLE, DISPOSABLE MISCELLANEOUS
Qty: 8 EACH | Refills: 11 | Status: SHIPPED | OUTPATIENT
Start: 2021-06-30 | End: 2021-06-30 | Stop reason: SDUPTHER

## 2021-06-30 RX ORDER — NEEDLES, DISPOSABLE 25GX5/8"
NEEDLE, DISPOSABLE MISCELLANEOUS
Qty: 8 EACH | Refills: 11 | Status: SHIPPED | OUTPATIENT
Start: 2021-06-30

## 2021-06-30 RX ORDER — NEEDLES, DISPOSABLE 25GX5/8"
NEEDLE, DISPOSABLE MISCELLANEOUS
Qty: 24 EACH | Refills: 5 | Status: SHIPPED | OUTPATIENT
Start: 2021-06-30 | End: 2021-06-30 | Stop reason: SDUPTHER

## 2021-06-30 RX ORDER — TESTOSTERONE CYPIONATE 200 MG/ML
INJECTION, SOLUTION INTRAMUSCULAR
Qty: 10 ML | Refills: 5 | Status: SHIPPED | OUTPATIENT
Start: 2021-06-30

## 2021-06-30 RX ORDER — TESTOSTERONE CYPIONATE 200 MG/ML
INJECTION, SOLUTION INTRAMUSCULAR
Qty: 10 ML | Refills: 5 | Status: SHIPPED | OUTPATIENT
Start: 2021-06-30 | End: 2021-06-30 | Stop reason: SDUPTHER

## 2021-06-30 NOTE — PROGRESS NOTES
Zheng Viera is a 40 y.o. male who presents for  evaluation of Hypogonadism      HPI     41 yo male    Male Hypogonadism    Duration , dating back to 2018    Timing , constant    Modifying Factor, was doing intramuscular testosterone but stopped to reassess. Workup from 8-2020 confirmed low testosterone   Complications/severity, patient has developed polycythemia and obstructive sleep apnea while on treatment     Additional symptoms, fatigue/weakness and low libido off testosterone      PE    There were no vitals taken for this visit.  AOx3  No visible goiter  Normal Respiratory Effort   No Edema    Labs    Lab Results   Component Value Date    WBC 7.19 09/04/2020    HGB 15.3 09/04/2020    HCT 45.0 09/04/2020    MCV 93.2 09/04/2020     09/04/2020     Lab Results   Component Value Date    GLUCOSE 85 09/04/2020    BUN 19 09/04/2020    CREATININE 1.18 09/04/2020    EGFRIFNONA 69 (L) 09/04/2020    BCR 16.1 09/04/2020    K 4.2 09/04/2020    CO2 27.0 09/04/2020    CALCIUM 8.9 09/04/2020    ALBUMIN 4.10 09/04/2020    AST 58 09/04/2020     (H) 09/04/2020             Assessment/Plan       ICD-10-CM ICD-9-CM   1. Prostatism  N40.0 600.90   2. Low libido  R68.82 799.81   3. Male hypogonadism  E29.1 257.2         I reviewed and summarized records from Chris Benitez MD from current year  and I reviewed / ordered labs.   From review of records :    Patient has male hypogonadism dating back to 2018.    Obstructive sleep apnea, compliant with CPAP     PSA nl     CBCnl     He stopped treatment for 3 months to reevaluate diagnosis and Free testosterone , even though done again by analogiue method is very low only at 2 and he is symptomatic    Low libido , fatigue    Restart treatment    He can get 10 ml vial of 200 mg per ml     Has been doing 0.3 ml IM ( prefers over sub Q ) twice weekly. We spoke that as long as he doesn't exceed 1 ml weekly we can consider safe dosing.           Orders Placed This Encounter    Procedures   • CBC Auto Differential     Order Specific Question:   Release to patient     Answer:   Immediate   • Comprehensive Metabolic Panel     Order Specific Question:   Release to patient     Answer:   Immediate   • Testosterone     Order Specific Question:   Release to patient     Answer:   Immediate   • PSA Total, Reflex To Free     Order Specific Question:   Release to patient     Answer:   Immediate         A copy of my note was sent to Chris Benitez MD    Please see my above opinion and suggestions.           This document has been electronically signed by Harpal Wheeler MD on June 30, 2021 14:56 CDT

## 2021-11-08 ENCOUNTER — OFFICE VISIT (OUTPATIENT)
Dept: SLEEP MEDICINE | Facility: HOSPITAL | Age: 41
End: 2021-11-08

## 2021-11-08 VITALS
DIASTOLIC BLOOD PRESSURE: 140 MMHG | HEIGHT: 69 IN | BODY MASS INDEX: 35.4 KG/M2 | WEIGHT: 239 LBS | OXYGEN SATURATION: 96 % | SYSTOLIC BLOOD PRESSURE: 142 MMHG | HEART RATE: 86 BPM

## 2021-11-08 DIAGNOSIS — G47.33 OBSTRUCTIVE SLEEP APNEA, ADULT: Primary | ICD-10-CM

## 2021-11-08 DIAGNOSIS — G47.19 EXCESSIVE DAYTIME SLEEPINESS: ICD-10-CM

## 2021-11-08 PROCEDURE — 99214 OFFICE O/P EST MOD 30 MIN: CPT | Performed by: NURSE PRACTITIONER

## 2021-11-08 NOTE — PATIENT INSTRUCTIONS
-Actigraphy monitoring (watch) 1-2 weeks prior to in lab testing  -COVID screening 1-3 days before in lab testing  -Overnight sleep study (while on CPAP, do not have to bring your own machine)  -Next day MSLT (multiple sleep latency testing)  -Follow up within 2 weeks for results

## 2021-11-08 NOTE — PROGRESS NOTES
Sleep Clinic Follow Up    Date: 2021  Primary Care Provider: Chris Benitez MD    Last office visit: 03/10/2020 (I reviewed this note)    CC: Follow up: BUCK on CPAP      Interim History:  Since the last visit:    1) moderate to severe BUCK -  Zheng Viera has recently remained compliant with CPAP. He denies mask and machine issues, dry mouth, headaches, or pressures intolerance. He denies abnormal dreams, sleep paralysis, nasal congestion, URI sx. He stopped using his CPAP for a few months during the safety recall, but he re-started using it 1-2 weeks ago. He also received his replacement DreamStation 2 machine two or three days ago and has started using it. Even with CPAP, he endorses excessive daytime sleepiness and is concerned about narcolepsy or other possible sleep disorders.    2) Patient denies RLS symptoms.     3) Excessive daytime sleepiness - Patient states that he can fall asleep within seconds of sitting down with his family in the evenings. He falls asleep while unstimulated but is also falling asleep in the middle of conversations and while watching TV. He reports this problem has gotten worse and endorses symptoms both with and without regular CPAP use. He has occasional sleepiness with driving but has not had any close calls or accidents. He reports eating ice while driving to help with tiredness. His current ESS is 20.    Sleep Testin. Split night PSG on 2019, AHI of 25, REM AHI of 61.3   2. CPAP titration on same day, recommended 10 cm H2O   3. Currently on 10 cm H2O    PAP Data:  *patient was using his CPAP regularly until the safety recall occurred.  Time frame: 2020-2021   Compliance: 44.4%  Average use on days used: 4 hrs 53 min  Percent of days with usage greater than or equal to 4 hours: 30.9%  PAP range: 10-20 cm H2O  Average 90% pressure: 11.8 cmH2O  Leak: 0 minutes  Average AHI: 2.6 events/hr  Mask type: Nasal mask  DME: [facility]    Bed time: 2230  Sleep  latency: 1-5 minutes  Number of times awakens during the night: 0-1 (with CPAP)  Wake time: 0630  Estimated total sleep time at night: 6-8 hours  Caffeine intake: 1-2 cups of coffee, 0 cups of tea, 1-2 sodas, and 0-1 energy drinks per day  Alcohol intake: 0-1 drinks per week  Nap time: most days, unplanned or unintentional   Sleepiness with Driving: some days     [place] - 20    PMHx, FH, SH reviewed and pertinent changes are: Reportedly unchanged from last office visit with us.      REVIEW OF SYSTEMS:   Negative for chest pain, SOA, fever, chills, cough, N/V/D, abdominal pain.    Smoking:none    Zheng Viera  reports that he has never smoked. He has never used smokeless tobacco.    Exam:  Vitals:    11/08/21 0957   BP: (!) 142/140   Pulse: 86   SpO2: 96%           11/08/21 0957   Weight: 108 kg (239 lb)     Body mass index is 35.28 kg/m². Patient's Body mass index is 35.28 kg/m². indicating that he is morbidly obese (BMI > 40 or > 35 with obesity - related health condition). Obesity-related health conditions include the following: obstructive sleep apnea. Obesity is unchanged. BMI is is above average; BMI management plan is completed. I recommend portion control and increasing exercise.      Gen:                No distress, conversant, pleasant, appears stated age, alert, oriented  Eyes:               Anicteric sclera, moist conjunctiva, no lid lag                           PERRL, EOMI   Heent:             NC/AT                          Normal hearing  Lungs:             Normal effort, non-labored breathing      CV:                  Normal S1/S2                          No lower extremity edema  ABD:               Rounded, non-distended                Psych:             Appropriate affect  Neuro:             CN 2-12 appear intact    Past Medical History:   Diagnosis Date   • Class 1 obesity due to excess calories without serious comorbidity with body mass index (BMI) of 34.0 to 34.9 in adult 4/24/2018   •  "Grade I hemorrhoids 4/24/2018   • Hypertriglyceridemia - mild 4/24/2018   • Male hypogonadism 5/18/2018       Current Outpatient Medications:   •  B-D 3CC LUER-TRINITY SYR 98SS2-6/2 23G X 1-1/2\" 3 ML misc, Use to inject Testosterone twice weekly, Disp: 24 each, Rfl: 5  •  fluticasone (FLONASE) 50 MCG/ACT nasal spray, 1 spray into each nostril., Disp: , Rfl:   •  Insulin Syringe 31G X 5/16\" 0.5 ML misc, Use as indicated, Disp: 50 each, Rfl: 11  •  Levocetirizine Dihydrochloride (XYZAL PO), Take  by mouth., Disp: , Rfl:   •  minocycline (MINOCIN,DYNACIN) 100 MG capsule, Take 100 mg by mouth Daily., Disp: , Rfl:   •  Needle, Disp, (BD Disp Needle) 23G X 1\" misc, Use twcie weekly, Disp: 8 each, Rfl: 11  •  Needle, Disp, (BD Hypodermic Needle) 18G X 1\" misc, Use to draw up testosterone twice weekly, Disp: 24 each, Rfl: 5  •  sharps container, 1 each As Needed (discard needles)., Disp: 1 each, Rfl: 1  •  Testosterone Cypionate (DEPOTESTOTERONE CYPIONATE) 200 MG/ML injection, 50 mg subcutaneously/IM twice weekly, provide #8 (21G,18G,3cc syringes) per month, Disp: 10 mL, Rfl: 5  •  vitamin B-12 (CYANOCOBALAMIN) 500 MCG tablet, Take 1 tablet by mouth Daily., Disp: , Rfl:     WBC   Date Value Ref Range Status   09/04/2020 7.19 3.40 - 10.80 10*3/mm3 Final     RBC   Date Value Ref Range Status   09/04/2020 4.83 4.14 - 5.80 10*6/mm3 Final     Hemoglobin   Date Value Ref Range Status   09/04/2020 15.3 13.0 - 17.7 g/dL Final     Hematocrit   Date Value Ref Range Status   09/04/2020 45.0 37.5 - 51.0 % Final     MCV   Date Value Ref Range Status   09/04/2020 93.2 79.0 - 97.0 fL Final     MCH   Date Value Ref Range Status   09/04/2020 31.7 26.6 - 33.0 pg Final     MCHC   Date Value Ref Range Status   09/04/2020 34.0 31.5 - 35.7 g/dL Final     RDW   Date Value Ref Range Status   09/04/2020 12.8 12.3 - 15.4 % Final     RDW-SD   Date Value Ref Range Status   09/04/2020 42.5 37.0 - 54.0 fl Final     MPV   Date Value Ref Range Status "   09/04/2020 10.1 6.0 - 12.0 fL Final     Platelets   Date Value Ref Range Status   09/04/2020 228 140 - 450 10*3/mm3 Final     Neutrophil %   Date Value Ref Range Status   09/04/2020 70.5 42.7 - 76.0 % Final     Lymphocyte %   Date Value Ref Range Status   09/04/2020 13.6 (L) 19.6 - 45.3 % Final     Monocyte %   Date Value Ref Range Status   09/04/2020 11.0 5.0 - 12.0 % Final     Eosinophil %   Date Value Ref Range Status   09/04/2020 3.2 0.3 - 6.2 % Final     Basophil %   Date Value Ref Range Status   09/04/2020 1.7 (H) 0.0 - 1.5 % Final     Neutrophils, Absolute   Date Value Ref Range Status   09/04/2020 5.07 1.70 - 7.00 10*3/mm3 Final     Lymphocytes, Absolute   Date Value Ref Range Status   09/04/2020 0.98 0.70 - 3.10 10*3/mm3 Final     Monocytes, Absolute   Date Value Ref Range Status   09/04/2020 0.79 0.10 - 0.90 10*3/mm3 Final     Eosinophils, Absolute   Date Value Ref Range Status   09/04/2020 0.23 0.00 - 0.40 10*3/mm3 Final     Basophils, Absolute   Date Value Ref Range Status   09/04/2020 0.12 0.00 - 0.20 10*3/mm3 Final     nRBC   Date Value Ref Range Status   04/17/2014 0  Final   04/17/2014 0  Final       Lab Results   Component Value Date    GLUCOSE 85 09/04/2020    BUN 19 09/04/2020    CREATININE 1.18 09/04/2020    EGFRIFNONA 69 (L) 09/04/2020    BCR 16.1 09/04/2020    K 4.2 09/04/2020    CO2 27.0 09/04/2020    CALCIUM 8.9 09/04/2020    ALBUMIN 4.10 09/04/2020    AST 58 09/04/2020     (H) 09/04/2020       Assessment and Plan:    1. Obstructive sleep apnea - Established, stable (1)  1. Was previously compliant with PAP therapy before the safety recall. Has recently re-started using CPAP and also received his replacement machine from [employer] in the past two days  2. Continue PAP as prescribed  3. Script for PAP supplies  2. Excessive daytime sleepiness, rule out idiopathic hypersomnia verus narcolepsy - New (to me), additional work-up planned (4)   1. Actigraphy monitoring x1-2 weeks  prior to in lab PSG with CPAP, next day MSLT, urine drug screening, COVID screening 1-3 days prior to in lab testing   2. Follow up within ~2 weeks of testing for results and further planning      I spent 35 minutes caring for Zheng on this date of service. This time includes time spent by me in the following activities: preparing for the visit, reviewing tests, obtaining and/or reviewing a separately obtained history, performing a medically appropriate examination and/or evaluation , counseling and educating the patient/family/caregiver, ordering medications, tests, or procedures, documenting information in the medical record and care coordination; discussing PAP therapy, PAP compliance, PAP maintenance and Further testing    RTC 2 weeks after testing. Patient agrees to return sooner if changes in symptoms.      This document has been electronically signed by OTTO Cantu on November 8, 2021 10:02 CST          CC: Chris Benitez MD          No ref. provider found

## 2022-01-06 ENCOUNTER — DOCUMENTATION (OUTPATIENT)
Dept: FAMILY MEDICINE CLINIC | Facility: CLINIC | Age: 42
End: 2022-01-06

## 2022-01-06 NOTE — PROGRESS NOTES
Orders were faxed to [facility] per the patients request, in addition to the positive covid lab from [facility] and patient demographics.

## 2022-01-10 ENCOUNTER — TELEPHONE (OUTPATIENT)
Dept: FAMILY MEDICINE CLINIC | Facility: CLINIC | Age: 42
End: 2022-01-10

## 2022-01-10 NOTE — TELEPHONE ENCOUNTER
Called Pt informed him that he did not qualify for the monoclonal infusin. Pt states he is feeling much better. He is drinking almost a gallon of water daily. Taking sudafed over the counter. He states [employer] has already released him back to work.

## 2022-02-24 ENCOUNTER — APPOINTMENT (OUTPATIENT)
Dept: SLEEP MEDICINE | Facility: HOSPITAL | Age: 42
End: 2022-02-24

## 2022-02-25 ENCOUNTER — LAB (OUTPATIENT)
Dept: LAB | Facility: OTHER | Age: 42
End: 2022-02-25

## 2022-02-25 ENCOUNTER — TRANSCRIBE ORDERS (OUTPATIENT)
Dept: LAB | Facility: OTHER | Age: 42
End: 2022-02-25

## 2022-02-25 DIAGNOSIS — I51.9 MYXEDEMA HEART DISEASE: ICD-10-CM

## 2022-02-25 DIAGNOSIS — E78.2 MIXED HYPERLIPIDEMIA: ICD-10-CM

## 2022-02-25 DIAGNOSIS — I10 ESSENTIAL (PRIMARY) HYPERTENSION: ICD-10-CM

## 2022-02-25 DIAGNOSIS — E03.9 MYXEDEMA HEART DISEASE: ICD-10-CM

## 2022-02-25 DIAGNOSIS — E11.00 TYPE II DIABETES MELLITUS WITH HYPEROSMOLARITY, UNCONTROLLED: ICD-10-CM

## 2022-02-25 DIAGNOSIS — E11.00 TYPE II DIABETES MELLITUS WITH HYPEROSMOLARITY, UNCONTROLLED: Primary | ICD-10-CM

## 2022-02-25 DIAGNOSIS — E11.65 TYPE II DIABETES MELLITUS WITH HYPEROSMOLARITY, UNCONTROLLED: Primary | ICD-10-CM

## 2022-02-25 DIAGNOSIS — E11.65 TYPE II DIABETES MELLITUS WITH HYPEROSMOLARITY, UNCONTROLLED: ICD-10-CM

## 2022-02-25 DIAGNOSIS — E29.1 MALE HYPOGONADISM: ICD-10-CM

## 2022-02-25 LAB
25(OH)D3 SERPL-MCNC: 37 NG/ML
ALBUMIN SERPL-MCNC: 4.5 G/DL (ref 3.5–5)
ALBUMIN UR-MCNC: <1.2 MG/DL
ALBUMIN/GLOB SERPL: 1.6 G/DL (ref 1.1–1.8)
ALP SERPL-CCNC: 72 U/L (ref 38–126)
ALT SERPL W P-5'-P-CCNC: 77 U/L
ANION GAP SERPL CALCULATED.3IONS-SCNC: 9 MMOL/L (ref 5–15)
AST SERPL-CCNC: 41 U/L (ref 17–59)
BILIRUB SERPL-MCNC: 0.6 MG/DL (ref 0.2–1.3)
BUN SERPL-MCNC: 17 MG/DL (ref 7–23)
BUN/CREAT SERPL: 15.3 (ref 7–25)
CALCIUM SPEC-SCNC: 9.3 MG/DL (ref 8.4–10.2)
CHLORIDE SERPL-SCNC: 105 MMOL/L (ref 101–112)
CHOLEST SERPL-MCNC: 208 MG/DL (ref 150–200)
CO2 SERPL-SCNC: 27 MMOL/L (ref 22–30)
CREAT SERPL-MCNC: 1.11 MG/DL (ref 0.7–1.3)
DEPRECATED RDW RBC AUTO: 43 FL (ref 37–54)
EOSINOPHIL # BLD MANUAL: 0.08 10*3/MM3 (ref 0–0.4)
EOSINOPHIL NFR BLD MANUAL: 1 % (ref 0.3–6.2)
ERYTHROCYTE [DISTWIDTH] IN BLOOD BY AUTOMATED COUNT: 13.1 % (ref 12.3–15.4)
ESTRADIOL SERPL HS-MCNC: 27.5 PG/ML
FSH SERPL-ACNC: 2.59 MIU/ML
GFR SERPL CREATININE-BSD FRML MDRD: 73 ML/MIN/1.73 (ref 63–147)
GLOBULIN UR ELPH-MCNC: 2.9 GM/DL (ref 2.3–3.5)
GLUCOSE SERPL-MCNC: 93 MG/DL (ref 70–99)
HBA1C MFR BLD: 5.3 % (ref 4.8–5.6)
HCT VFR BLD AUTO: 46.1 % (ref 37.5–51)
HDLC SERPL-MCNC: 55 MG/DL (ref 40–59)
HGB BLD-MCNC: 15.9 G/DL (ref 13–17.7)
LDLC SERPL CALC-MCNC: 137 MG/DL
LDLC/HDLC SERPL: 2.45 {RATIO} (ref 0–3.55)
LH SERPL-ACNC: 3.05 MIU/ML
LYMPHOCYTES # BLD MANUAL: 1.24 10*3/MM3 (ref 0.7–3.1)
LYMPHOCYTES NFR BLD MANUAL: 8 % (ref 5–12)
MCH RBC QN AUTO: 31.8 PG (ref 26.6–33)
MCHC RBC AUTO-ENTMCNC: 34.5 G/DL (ref 31.5–35.7)
MCV RBC AUTO: 92.2 FL (ref 79–97)
MONOCYTES # BLD: 0.66 10*3/MM3 (ref 0.1–0.9)
NEUTROPHILS # BLD AUTO: 6.28 10*3/MM3 (ref 1.7–7)
NEUTROPHILS NFR BLD MANUAL: 74 % (ref 42.7–76)
NEUTS BAND NFR BLD MANUAL: 2 % (ref 0–5)
PLATELET # BLD AUTO: 295 10*3/MM3 (ref 140–450)
PMV BLD AUTO: 10.3 FL (ref 6–12)
POTASSIUM SERPL-SCNC: 4.3 MMOL/L (ref 3.4–5)
PROT SERPL-MCNC: 7.4 G/DL (ref 6.3–8.6)
RBC # BLD AUTO: 5 10*6/MM3 (ref 4.14–5.8)
RBC MORPH BLD: NORMAL
SMALL PLATELETS BLD QL SMEAR: ADEQUATE
SODIUM SERPL-SCNC: 141 MMOL/L (ref 137–145)
T4 FREE SERPL-MCNC: 1.09 NG/DL (ref 0.93–1.7)
TRIGL SERPL-MCNC: 92 MG/DL
TSH SERPL DL<=0.05 MIU/L-ACNC: 2.47 UIU/ML (ref 0.27–4.2)
VARIANT LYMPHS NFR BLD MANUAL: 1 % (ref 0–5)
VARIANT LYMPHS NFR BLD MANUAL: 14 % (ref 19.6–45.3)
VLDLC SERPL-MCNC: 16 MG/DL (ref 5–40)
WBC MORPH BLD: NORMAL
WBC NRBC COR # BLD: 8.26 10*3/MM3 (ref 3.4–10.8)

## 2022-02-25 PROCEDURE — 84439 ASSAY OF FREE THYROXINE: CPT | Performed by: NURSE PRACTITIONER

## 2022-02-25 PROCEDURE — 36415 COLL VENOUS BLD VENIPUNCTURE: CPT | Performed by: INTERNAL MEDICINE

## 2022-02-25 PROCEDURE — 85007 BL SMEAR W/DIFF WBC COUNT: CPT | Performed by: INTERNAL MEDICINE

## 2022-02-25 PROCEDURE — 82306 VITAMIN D 25 HYDROXY: CPT | Performed by: NURSE PRACTITIONER

## 2022-02-25 PROCEDURE — 84481 FREE ASSAY (FT-3): CPT | Performed by: NURSE PRACTITIONER

## 2022-02-25 PROCEDURE — 84403 ASSAY OF TOTAL TESTOSTERONE: CPT | Performed by: NURSE PRACTITIONER

## 2022-02-25 PROCEDURE — 80061 LIPID PANEL: CPT | Performed by: INTERNAL MEDICINE

## 2022-02-25 PROCEDURE — 82670 ASSAY OF TOTAL ESTRADIOL: CPT | Performed by: NURSE PRACTITIONER

## 2022-02-25 PROCEDURE — 83002 ASSAY OF GONADOTROPIN (LH): CPT | Performed by: NURSE PRACTITIONER

## 2022-02-25 PROCEDURE — 83001 ASSAY OF GONADOTROPIN (FSH): CPT | Performed by: NURSE PRACTITIONER

## 2022-02-25 PROCEDURE — 84402 ASSAY OF FREE TESTOSTERONE: CPT | Performed by: NURSE PRACTITIONER

## 2022-02-25 PROCEDURE — 80050 GENERAL HEALTH PANEL: CPT | Performed by: INTERNAL MEDICINE

## 2022-02-25 PROCEDURE — 83036 HEMOGLOBIN GLYCOSYLATED A1C: CPT | Performed by: NURSE PRACTITIONER

## 2022-02-25 PROCEDURE — 82043 UR ALBUMIN QUANTITATIVE: CPT | Performed by: NURSE PRACTITIONER

## 2022-02-26 LAB — T3FREE SERPL-MCNC: 3.84 PG/ML (ref 2–4.4)

## 2022-02-27 LAB
TESTOST FREE SERPL-MCNC: 11.6 PG/ML (ref 6.8–21.5)
TESTOST SERPL-MCNC: 349 NG/DL (ref 264–916)

## 2022-03-03 ENCOUNTER — APPOINTMENT (OUTPATIENT)
Dept: SLEEP MEDICINE | Facility: HOSPITAL | Age: 42
End: 2022-03-03

## 2022-03-04 ENCOUNTER — APPOINTMENT (OUTPATIENT)
Dept: SLEEP MEDICINE | Facility: HOSPITAL | Age: 42
End: 2022-03-04

## 2022-03-18 ENCOUNTER — LAB (OUTPATIENT)
Dept: LAB | Facility: OTHER | Age: 42
End: 2022-03-18

## 2022-03-18 ENCOUNTER — TRANSCRIBE ORDERS (OUTPATIENT)
Dept: LAB | Facility: OTHER | Age: 42
End: 2022-03-18

## 2022-03-18 DIAGNOSIS — E29.1 TESTICULAR HYPOFUNCTION: ICD-10-CM

## 2022-03-18 DIAGNOSIS — E29.1 TESTICULAR HYPOFUNCTION: Primary | ICD-10-CM

## 2022-03-18 LAB
FSH SERPL-ACNC: 0.97 MIU/ML
LH SERPL-ACNC: 0.71 MIU/ML
TESTOST SERPL-MCNC: 605 NG/DL (ref 249–836)

## 2022-03-18 PROCEDURE — 84403 ASSAY OF TOTAL TESTOSTERONE: CPT | Performed by: INTERNAL MEDICINE

## 2022-03-18 PROCEDURE — 83002 ASSAY OF GONADOTROPIN (LH): CPT | Performed by: INTERNAL MEDICINE

## 2022-03-18 PROCEDURE — 36415 COLL VENOUS BLD VENIPUNCTURE: CPT | Performed by: INTERNAL MEDICINE

## 2022-03-18 PROCEDURE — 83001 ASSAY OF GONADOTROPIN (FSH): CPT | Performed by: INTERNAL MEDICINE

## 2022-04-21 ENCOUNTER — TELEMEDICINE (OUTPATIENT)
Dept: FAMILY MEDICINE CLINIC | Facility: CLINIC | Age: 42
End: 2022-04-21

## 2022-04-21 VITALS — WEIGHT: 239 LBS | BODY MASS INDEX: 35.4 KG/M2 | HEIGHT: 69 IN

## 2022-04-21 DIAGNOSIS — E66.9 OBESITY, CLASS II, BMI 35-39.9, ISOLATED: Primary | ICD-10-CM

## 2022-04-21 DIAGNOSIS — G47.33 OBSTRUCTIVE SLEEP APNEA SYNDROME: ICD-10-CM

## 2022-04-21 DIAGNOSIS — K21.9 GASTROESOPHAGEAL REFLUX DISEASE WITHOUT ESOPHAGITIS: ICD-10-CM

## 2022-04-21 PROCEDURE — 99213 OFFICE O/P EST LOW 20 MIN: CPT | Performed by: FAMILY MEDICINE

## 2022-04-21 RX ORDER — SEMAGLUTIDE 0.25 MG/.5ML
0.25 INJECTION, SOLUTION SUBCUTANEOUS WEEKLY
Qty: 2 ML | Refills: 0 | Status: SHIPPED | OUTPATIENT
Start: 2022-04-21

## 2022-04-21 RX ORDER — PANTOPRAZOLE SODIUM 20 MG/1
TABLET, DELAYED RELEASE ORAL
COMMUNITY
Start: 2022-04-04

## 2022-04-21 RX ORDER — ARMODAFINIL 150 MG/1
150 TABLET ORAL DAILY
COMMUNITY

## 2022-04-21 RX ORDER — TESTOSTERONE UNDECANOATE 237 MG/1
CAPSULE, LIQUID FILLED ORAL
COMMUNITY
Start: 2022-04-11

## 2022-04-21 NOTE — PROGRESS NOTES
Subjective   Zheng Viera is a 41 y.o. male.   Seen today by video visit due to the Covid-19 quarantine.   You have chosen to receive care through a telehealth visit.  Do you consent to use a video/audio connection for your medical care today? Yes  Patient seeing meet concerning his weight.  He has made efforts with diet and exercise and has tried various diets including low-fat low-carb and keto.  He is very physically active.  Despite all of this he has not been able to achieve or maintain any meaningful amount of weight loss.  Comorbidities of his obesity include GERD, and sleep apnea.    History of Present Illness    The following portions of the patient's history were reviewed and updated as appropriate: allergies, current medications, past family history, past medical history, past social history, past surgical history and problem list.    Review of Systems   Constitutional: Negative for activity change, appetite change, diaphoresis, fatigue, fever and unexpected weight change.   HENT: Negative for congestion, ear pain, hearing loss, sinus pressure, sore throat, tinnitus, trouble swallowing and voice change.    Eyes: Negative.    Respiratory: Negative.    Cardiovascular: Negative.    Gastrointestinal: Negative for abdominal distention, abdominal pain, blood in stool, constipation, diarrhea, nausea and vomiting.   Endocrine: Negative.    Genitourinary: Negative for decreased urine volume, dysuria, frequency, hematuria and urgency.   Musculoskeletal: Negative.    Skin: Negative.    Allergic/Immunologic: Negative.    Neurological: Negative for dizziness, tremors, seizures, syncope, speech difficulty, weakness, numbness and headaches.   Hematological: Negative.    Psychiatric/Behavioral: Negative for dysphoric mood and sleep disturbance. The patient is not nervous/anxious.    All other systems reviewed and are negative.      Objective   Physical Exam  Constitutional:       General: He is not in acute  distress.     Appearance: He is well-developed.   HENT:      Head: Normocephalic and atraumatic.   Eyes:      General:         Right eye: No discharge.         Left eye: No discharge.      Pupils: Pupils are equal, round, and reactive to light.   Pulmonary:      Effort: Pulmonary effort is normal.   Neurological:      Mental Status: He is alert and oriented to person, place, and time.   Psychiatric:         Behavior: Behavior normal.         Thought Content: Thought content normal.         Judgment: Judgment normal.         Assessment/Plan   Diagnoses and all orders for this visit:    1. Obesity, Class II, BMI 35-39.9, isolated (Primary)    2. Obstructive sleep apnea syndrome    3. Gastroesophageal reflux disease without esophagitis    Other orders  -     Semaglutide-Weight Management (Wegovy) 0.25 MG/0.5ML solution auto-injector; Inject 0.25 mg under the skin into the appropriate area as directed 1 (One) Time Per Week.  Dispense: 2 mL; Refill: 0    Patient's (Body mass index is 35.29 kg/m².) indicates that they are obese (BMI >30) with health related conditions that include obstructive sleep apnea and GERD . Weight is unchanged. BMI is is above average; BMI management plan is completed. We discussed low calorie, low carb based diet program, portion control, increasing exercise and pharmacologic options including Wegovy.      We will start the Wegovy as he has multiple comorbidities that could be greatly improved with weight loss and has been unable to achieve this with diet and exercise alone.  Will monitor at least every 3 months and continue to follow closely          This document has been electronically signed by Zhane Morocho MD on April 21, 2022 12:29 CDT

## 2022-11-28 ENCOUNTER — LAB (OUTPATIENT)
Dept: LAB | Facility: OTHER | Age: 42
End: 2022-11-28

## 2022-11-28 ENCOUNTER — TRANSCRIBE ORDERS (OUTPATIENT)
Dept: LAB | Facility: OTHER | Age: 42
End: 2022-11-28

## 2022-11-28 DIAGNOSIS — E78.2 MIXED HYPERLIPIDEMIA: ICD-10-CM

## 2022-11-28 DIAGNOSIS — E29.1 TESTICULAR HYPOFUNCTION: ICD-10-CM

## 2022-11-28 DIAGNOSIS — I10 ESSENTIAL (PRIMARY) HYPERTENSION: Primary | ICD-10-CM

## 2022-11-28 DIAGNOSIS — I10 ESSENTIAL (PRIMARY) HYPERTENSION: ICD-10-CM

## 2022-11-28 LAB
ALBUMIN SERPL-MCNC: 4.4 G/DL (ref 3.5–5)
ALBUMIN/GLOB SERPL: 1.6 G/DL (ref 1.1–1.8)
ALP SERPL-CCNC: 73 U/L (ref 38–126)
ALT SERPL W P-5'-P-CCNC: 64 U/L
ANION GAP SERPL CALCULATED.3IONS-SCNC: 7 MMOL/L (ref 5–15)
AST SERPL-CCNC: 37 U/L (ref 17–59)
BASOPHILS # BLD AUTO: 0.11 10*3/MM3 (ref 0–0.2)
BASOPHILS NFR BLD AUTO: 1.3 % (ref 0–1.5)
BILIRUB SERPL-MCNC: 0.5 MG/DL (ref 0.2–1.3)
BUN SERPL-MCNC: 15 MG/DL (ref 7–23)
BUN/CREAT SERPL: 12.3 (ref 7–25)
CALCIUM SPEC-SCNC: 9 MG/DL (ref 8.4–10.2)
CHLORIDE SERPL-SCNC: 103 MMOL/L (ref 101–112)
CHOLEST SERPL-MCNC: 221 MG/DL (ref 150–200)
CO2 SERPL-SCNC: 28 MMOL/L (ref 22–30)
CREAT SERPL-MCNC: 1.22 MG/DL (ref 0.7–1.3)
DEPRECATED RDW RBC AUTO: 44.4 FL (ref 37–54)
EGFRCR SERPLBLD CKD-EPI 2021: 76.4 ML/MIN/1.73
EOSINOPHIL # BLD AUTO: 0.22 10*3/MM3 (ref 0–0.4)
EOSINOPHIL NFR BLD AUTO: 2.6 % (ref 0.3–6.2)
ERYTHROCYTE [DISTWIDTH] IN BLOOD BY AUTOMATED COUNT: 13 % (ref 12.3–15.4)
ESTRADIOL SERPL HS-MCNC: 20.1 PG/ML
FSH SERPL-ACNC: 2.96 MIU/ML
GLOBULIN UR ELPH-MCNC: 2.8 GM/DL (ref 2.3–3.5)
GLUCOSE SERPL-MCNC: 100 MG/DL (ref 70–99)
HCT VFR BLD AUTO: 49.8 % (ref 37.5–51)
HDLC SERPL-MCNC: 47 MG/DL (ref 40–59)
HGB BLD-MCNC: 16.6 G/DL (ref 13–17.7)
IMM GRANULOCYTES # BLD AUTO: 0.11 10*3/MM3 (ref 0–0.05)
IMM GRANULOCYTES NFR BLD AUTO: 1.3 % (ref 0–0.5)
LDLC SERPL CALC-MCNC: 153 MG/DL
LDLC/HDLC SERPL: 3.21 {RATIO} (ref 0–3.55)
LH SERPL-ACNC: 4.01 MIU/ML
LYMPHOCYTES # BLD AUTO: 1.42 10*3/MM3 (ref 0.7–3.1)
LYMPHOCYTES NFR BLD AUTO: 16.9 % (ref 19.6–45.3)
MCH RBC QN AUTO: 31.3 PG (ref 26.6–33)
MCHC RBC AUTO-ENTMCNC: 33.3 G/DL (ref 31.5–35.7)
MCV RBC AUTO: 93.8 FL (ref 79–97)
MONOCYTES # BLD AUTO: 0.62 10*3/MM3 (ref 0.1–0.9)
MONOCYTES NFR BLD AUTO: 7.4 % (ref 5–12)
NEUTROPHILS NFR BLD AUTO: 5.9 10*3/MM3 (ref 1.7–7)
NEUTROPHILS NFR BLD AUTO: 70.5 % (ref 42.7–76)
NRBC BLD AUTO-RTO: 0 /100 WBC (ref 0–0.2)
PLATELET # BLD AUTO: 339 10*3/MM3 (ref 140–450)
PMV BLD AUTO: 10.5 FL (ref 6–12)
POTASSIUM SERPL-SCNC: 4.5 MMOL/L (ref 3.4–5)
PROT SERPL-MCNC: 7.2 G/DL (ref 6.3–8.6)
RBC # BLD AUTO: 5.31 10*6/MM3 (ref 4.14–5.8)
SODIUM SERPL-SCNC: 138 MMOL/L (ref 137–145)
TRIGL SERPL-MCNC: 115 MG/DL
TSH SERPL DL<=0.05 MIU/L-ACNC: 3.46 UIU/ML (ref 0.27–4.2)
VLDLC SERPL-MCNC: 21 MG/DL (ref 5–40)
WBC NRBC COR # BLD: 8.38 10*3/MM3 (ref 3.4–10.8)

## 2022-11-28 PROCEDURE — 84402 ASSAY OF FREE TESTOSTERONE: CPT | Performed by: NURSE PRACTITIONER

## 2022-11-28 PROCEDURE — 80050 GENERAL HEALTH PANEL: CPT | Performed by: INTERNAL MEDICINE

## 2022-11-28 PROCEDURE — 84403 ASSAY OF TOTAL TESTOSTERONE: CPT | Performed by: NURSE PRACTITIONER

## 2022-11-28 PROCEDURE — 83002 ASSAY OF GONADOTROPIN (LH): CPT | Performed by: NURSE PRACTITIONER

## 2022-11-28 PROCEDURE — 80061 LIPID PANEL: CPT | Performed by: INTERNAL MEDICINE

## 2022-11-28 PROCEDURE — 82670 ASSAY OF TOTAL ESTRADIOL: CPT | Performed by: NURSE PRACTITIONER

## 2022-11-28 PROCEDURE — 36415 COLL VENOUS BLD VENIPUNCTURE: CPT | Performed by: INTERNAL MEDICINE

## 2022-11-28 PROCEDURE — 83001 ASSAY OF GONADOTROPIN (FSH): CPT | Performed by: NURSE PRACTITIONER

## 2022-11-28 PROCEDURE — 36415 COLL VENOUS BLD VENIPUNCTURE: CPT

## 2022-12-02 LAB
TESTOST FREE SERPL-MCNC: 6.9 PG/ML (ref 6.8–21.5)
TESTOST SERPL-MCNC: 188.8 NG/DL (ref 264–916)